# Patient Record
Sex: MALE | Race: WHITE | NOT HISPANIC OR LATINO | Employment: OTHER | URBAN - NONMETROPOLITAN AREA
[De-identification: names, ages, dates, MRNs, and addresses within clinical notes are randomized per-mention and may not be internally consistent; named-entity substitution may affect disease eponyms.]

---

## 2019-03-13 ENCOUNTER — APPOINTMENT (OUTPATIENT)
Dept: GENERAL RADIOLOGY | Facility: HOSPITAL | Age: 84
End: 2019-03-13

## 2019-03-13 ENCOUNTER — HOSPITAL ENCOUNTER (EMERGENCY)
Facility: HOSPITAL | Age: 84
Discharge: HOME | End: 2019-03-13
Attending: EMERGENCY MEDICINE | Admitting: FAMILY MEDICINE

## 2019-03-13 ENCOUNTER — APPOINTMENT (OUTPATIENT)
Dept: CT IMAGING | Facility: HOSPITAL | Age: 84
End: 2019-03-13

## 2019-03-13 VITALS
HEIGHT: 69 IN | OXYGEN SATURATION: 98 % | TEMPERATURE: 97.4 F | WEIGHT: 163 LBS | HEART RATE: 54 BPM | RESPIRATION RATE: 16 BRPM | DIASTOLIC BLOOD PRESSURE: 80 MMHG | SYSTOLIC BLOOD PRESSURE: 189 MMHG | BODY MASS INDEX: 24.14 KG/M2

## 2019-03-13 DIAGNOSIS — R55 SYNCOPE, UNSPECIFIED SYNCOPE TYPE: ICD-10-CM

## 2019-03-13 DIAGNOSIS — N28.9 FUNCTION KIDNEY DECREASED: ICD-10-CM

## 2019-03-13 DIAGNOSIS — I95.1 ORTHOSTATIC HYPOTENSION: Primary | ICD-10-CM

## 2019-03-13 LAB
ALBUMIN SERPL-MCNC: 3.9 G/DL (ref 3.4–4.8)
ALBUMIN/GLOB SERPL: 1.2 G/DL (ref 1.1–1.8)
ALP SERPL-CCNC: 73 U/L (ref 38–126)
ALT SERPL W P-5'-P-CCNC: 10 U/L (ref 21–72)
ANION GAP SERPL CALCULATED.3IONS-SCNC: 9 MMOL/L (ref 5–15)
AST SERPL-CCNC: 25 U/L (ref 17–59)
BASOPHILS # BLD AUTO: 0.05 10*3/MM3 (ref 0–0.2)
BASOPHILS NFR BLD AUTO: 0.6 % (ref 0–1.5)
BILIRUB SERPL-MCNC: 0.3 MG/DL (ref 0.2–1.3)
BUN BLD-MCNC: 24 MG/DL (ref 7–21)
BUN/CREAT SERPL: 15.5 (ref 7–25)
CALCIUM SPEC-SCNC: 8.7 MG/DL (ref 8.4–10.2)
CHLORIDE SERPL-SCNC: 104 MMOL/L (ref 95–110)
CO2 SERPL-SCNC: 25 MMOL/L (ref 22–31)
CREAT BLD-MCNC: 1.55 MG/DL (ref 0.7–1.3)
DEPRECATED RDW RBC AUTO: 48.3 FL (ref 37–54)
EOSINOPHIL # BLD AUTO: 0.43 10*3/MM3 (ref 0–0.4)
EOSINOPHIL NFR BLD AUTO: 5 % (ref 0.3–6.2)
ERYTHROCYTE [DISTWIDTH] IN BLOOD BY AUTOMATED COUNT: 14.2 % (ref 12.3–15.4)
GFR SERPL CREATININE-BSD FRML MDRD: 43 ML/MIN/1.73 (ref 42–98)
GLOBULIN UR ELPH-MCNC: 3.3 GM/DL (ref 2.3–3.5)
GLUCOSE BLD-MCNC: 120 MG/DL (ref 60–100)
HCT VFR BLD AUTO: 31.7 % (ref 37.5–51)
HGB BLD-MCNC: 10.3 G/DL (ref 13–17.7)
HOLD SPECIMEN: NORMAL
HOLD SPECIMEN: NORMAL
IMM GRANULOCYTES # BLD AUTO: 0.03 10*3/MM3 (ref 0–0.05)
IMM GRANULOCYTES NFR BLD AUTO: 0.3 % (ref 0–0.5)
LYMPHOCYTES # BLD AUTO: 2.18 10*3/MM3 (ref 0.7–3.1)
LYMPHOCYTES NFR BLD AUTO: 25.2 % (ref 19.6–45.3)
MAGNESIUM SERPL-MCNC: 1.9 MG/DL (ref 1.6–2.3)
MCH RBC QN AUTO: 30 PG (ref 26.6–33)
MCHC RBC AUTO-ENTMCNC: 32.5 G/DL (ref 31.5–35.7)
MCV RBC AUTO: 92.4 FL (ref 79–97)
MONOCYTES # BLD AUTO: 0.63 10*3/MM3 (ref 0.1–0.9)
MONOCYTES NFR BLD AUTO: 7.3 % (ref 5–12)
NEUTROPHILS # BLD AUTO: 5.34 10*3/MM3 (ref 1.4–7)
NEUTROPHILS NFR BLD AUTO: 61.6 % (ref 42.7–76)
NRBC BLD AUTO-RTO: 0 /100 WBC (ref 0–0)
PLATELET # BLD AUTO: 187 10*3/MM3 (ref 140–450)
PMV BLD AUTO: 11 FL (ref 6–12)
POTASSIUM BLD-SCNC: 3.7 MMOL/L (ref 3.5–5.1)
PROT SERPL-MCNC: 7.2 G/DL (ref 6.3–8.6)
RBC # BLD AUTO: 3.43 10*6/MM3 (ref 4.14–5.8)
SODIUM BLD-SCNC: 138 MMOL/L (ref 137–145)
TROPONIN I SERPL-MCNC: <0.012 NG/ML
WBC NRBC COR # BLD: 8.66 10*3/MM3 (ref 3.4–10.8)
WHOLE BLOOD HOLD SPECIMEN: NORMAL
WHOLE BLOOD HOLD SPECIMEN: NORMAL

## 2019-03-13 PROCEDURE — 71045 X-RAY EXAM CHEST 1 VIEW: CPT

## 2019-03-13 PROCEDURE — 80053 COMPREHEN METABOLIC PANEL: CPT | Performed by: EMERGENCY MEDICINE

## 2019-03-13 PROCEDURE — 85025 COMPLETE CBC W/AUTO DIFF WBC: CPT | Performed by: EMERGENCY MEDICINE

## 2019-03-13 PROCEDURE — 93005 ELECTROCARDIOGRAM TRACING: CPT | Performed by: EMERGENCY MEDICINE

## 2019-03-13 PROCEDURE — 96360 HYDRATION IV INFUSION INIT: CPT

## 2019-03-13 PROCEDURE — 93010 ELECTROCARDIOGRAM REPORT: CPT | Performed by: INTERNAL MEDICINE

## 2019-03-13 PROCEDURE — 99284 EMERGENCY DEPT VISIT MOD MDM: CPT

## 2019-03-13 PROCEDURE — 83735 ASSAY OF MAGNESIUM: CPT | Performed by: EMERGENCY MEDICINE

## 2019-03-13 PROCEDURE — 70450 CT HEAD/BRAIN W/O DYE: CPT

## 2019-03-13 PROCEDURE — 84484 ASSAY OF TROPONIN QUANT: CPT | Performed by: EMERGENCY MEDICINE

## 2019-03-13 RX ORDER — SODIUM CHLORIDE 0.9 % (FLUSH) 0.9 %
10 SYRINGE (ML) INJECTION AS NEEDED
Status: DISCONTINUED | OUTPATIENT
Start: 2019-03-13 | End: 2019-03-13 | Stop reason: HOSPADM

## 2019-03-13 RX ORDER — LANOLIN ALCOHOL/MO/W.PET/CERES
1000 CREAM (GRAM) TOPICAL DAILY
COMMUNITY
End: 2021-06-29 | Stop reason: HOSPADM

## 2019-03-13 RX ORDER — CLONIDINE HYDROCHLORIDE 0.2 MG/1
0.2 TABLET ORAL ONCE
Status: COMPLETED | OUTPATIENT
Start: 2019-03-13 | End: 2019-03-13

## 2019-03-13 RX ORDER — DONEPEZIL HYDROCHLORIDE 10 MG/1
10 TABLET, FILM COATED ORAL
Status: ON HOLD | COMMUNITY
End: 2022-01-12

## 2019-03-13 RX ORDER — HYDRALAZINE HYDROCHLORIDE 20 MG/ML
20 INJECTION INTRAMUSCULAR; INTRAVENOUS ONCE
Status: DISCONTINUED | OUTPATIENT
Start: 2019-03-13 | End: 2019-03-13

## 2019-03-13 RX ORDER — VERAPAMIL HYDROCHLORIDE 180 MG/1
120 CAPSULE, EXTENDED RELEASE ORAL
COMMUNITY

## 2019-03-13 RX ORDER — MEMANTINE HYDROCHLORIDE 10 MG/1
10 TABLET ORAL
Status: ON HOLD | COMMUNITY
End: 2022-01-12

## 2019-03-13 RX ORDER — ASPIRIN 81 MG/1
81 TABLET, CHEWABLE ORAL DAILY
COMMUNITY
End: 2022-01-14 | Stop reason: HOSPADM

## 2019-03-13 RX ORDER — ROSUVASTATIN CALCIUM 20 MG/1
20 TABLET, COATED ORAL
COMMUNITY

## 2019-03-13 RX ADMIN — SODIUM CHLORIDE 1000 ML: 900 INJECTION, SOLUTION INTRAVENOUS at 19:18

## 2019-03-13 RX ADMIN — CLONIDINE HYDROCHLORIDE 0.2 MG: 0.2 TABLET ORAL at 21:16

## 2019-03-13 NOTE — ED PROVIDER NOTES
Subjective   Patient presents to emergency department for syncope.  [place] [address] and had an episode of syncope, nausea, diaphoresis.  Wife states he returned to baseline after just a few minutes.  He does have moderate to severe dementia.  Patient currently asymptomatic.          History provided by:  Patient   used: No    Syncope   Episode history:  Single  Most recent episode:  Today  Duration:  2 minutes  Progression:  Resolved  Chronicity:  New  Context: normal activity and standing up    Witnessed: yes    Relieved by: rest.  Associated symptoms: confusion    Associated symptoms: no anxiety, no chest pain, no diaphoresis, no difficulty breathing, no dizziness, no fever, no focal sensory loss, no focal weakness, no headaches, no malaise/fatigue, no nausea, no palpitations, no recent injury, no rectal bleeding, no shortness of breath, no visual change, no vomiting and no weakness        Review of Systems   Constitutional: Negative for diaphoresis, fever and malaise/fatigue.   HENT: Negative for sore throat and trouble swallowing.    Eyes: Negative for visual disturbance.   Respiratory: Negative for shortness of breath.    Cardiovascular: Positive for syncope. Negative for chest pain and palpitations.   Gastrointestinal: Negative for nausea and vomiting.   Genitourinary: Negative for dysuria and flank pain.   Musculoskeletal: Negative for back pain.   Skin: Negative for color change.   Allergic/Immunologic: Negative for immunocompromised state.   Neurological: Negative for dizziness, focal weakness, weakness and headaches.   Psychiatric/Behavioral: Positive for confusion.        Wife says he has dementia and is at baseline       Past Medical History:   Diagnosis Date   • Alzheimer's dementia    • Hyperlipidemia    • Hypertension        Allergies   Allergen Reactions   • Codeine Other (See Comments)     Unknown reaction; patient's wife stated that patient is allergic to  "medication.        Past Surgical History:   Procedure Laterality Date   • KNEE SURGERY Left        History reviewed. No pertinent family history.    Social History     Socioeconomic History   • Marital status:      Spouse name: Not on file   • Number of children: Not on file   • Years of education: Not on file   • Highest education level: Not on file   Tobacco Use   • Smoking status: Never Smoker   • Smokeless tobacco: Never Used   Substance and Sexual Activity   • Alcohol use: No     Frequency: Never   • Drug use: No   • Sexual activity: Defer           Objective   Physical Exam   Constitutional: He is oriented to person, place, and time. He appears well-developed and well-nourished. No distress.   HENT:   Head: Atraumatic.   Indentation in posterior right scalp from \"horse kicking him when he was a child\".   Eyes: Conjunctivae and EOM are normal. Pupils are equal, round, and reactive to light. No scleral icterus.   Neck: Normal range of motion. Neck supple.   Cardiovascular: Normal rate, regular rhythm, normal heart sounds and intact distal pulses.   Pulmonary/Chest: Effort normal and breath sounds normal. No respiratory distress. He has no wheezes.   Abdominal: Soft. He exhibits no distension and no mass. There is no tenderness. There is no guarding.   Musculoskeletal: He exhibits no edema.   Neurological: He is alert and oriented to person, place, and time.   Skin: Skin is warm. Capillary refill takes less than 2 seconds.   Psychiatric: He has a normal mood and affect. His behavior is normal. Thought content normal.   Nursing note and vitals reviewed.      ECG 12 Lead    Date/Time: 3/13/2019 8:32 PM  Performed by: Lavelle Islas PA-C  Authorized by: Deon Delgado MD   Interpreted by physician  Comparison: compared with previous ECG from 5/13/2013  Rhythm: sinus bradycardia  Rate: bradycardic  BPM: 56  ST Segments: ST segments normal  Clinical impression: non-specific ECG                 ED " Course  ED Course as of Mar 13 2045   Wed Mar 13, 2019   2028 Orthostatic vitals show likely mild volume depletion.    [WINSOME]      ED Course User Index  [WINSOME] Lavelle Islas PA-C      Results for orders placed or performed during the hospital encounter of 03/13/19   Comprehensive Metabolic Panel   Result Value Ref Range    Glucose 120 (H) 60 - 100 mg/dL    BUN 24 (H) 7 - 21 mg/dL    Creatinine 1.55 (H) 0.70 - 1.30 mg/dL    Sodium 138 137 - 145 mmol/L    Potassium 3.7 3.5 - 5.1 mmol/L    Chloride 104 95 - 110 mmol/L    CO2 25.0 22.0 - 31.0 mmol/L    Calcium 8.7 8.4 - 10.2 mg/dL    Total Protein 7.2 6.3 - 8.6 g/dL    Albumin 3.90 3.40 - 4.80 g/dL    ALT (SGPT) 10 (L) 21 - 72 U/L    AST (SGOT) 25 17 - 59 U/L    Alkaline Phosphatase 73 38 - 126 U/L    Total Bilirubin 0.3 0.2 - 1.3 mg/dL    eGFR Non African Amer 43 42 - 98 mL/min/1.73    Globulin 3.3 2.3 - 3.5 gm/dL    A/G Ratio 1.2 1.1 - 1.8 g/dL    BUN/Creatinine Ratio 15.5 7.0 - 25.0    Anion Gap 9.0 5.0 - 15.0 mmol/L   Magnesium   Result Value Ref Range    Magnesium 1.9 1.6 - 2.3 mg/dL   Troponin   Result Value Ref Range    Troponin I <0.012 <=0.034 ng/mL   CBC Auto Differential   Result Value Ref Range    WBC 8.66 3.40 - 10.80 10*3/mm3    RBC 3.43 (L) 4.14 - 5.80 10*6/mm3    Hemoglobin 10.3 (L) 13.0 - 17.7 g/dL    Hematocrit 31.7 (L) 37.5 - 51.0 %    MCV 92.4 79.0 - 97.0 fL    MCH 30.0 26.6 - 33.0 pg    MCHC 32.5 31.5 - 35.7 g/dL    RDW 14.2 12.3 - 15.4 %    RDW-SD 48.3 37.0 - 54.0 fl    MPV 11.0 6.0 - 12.0 fL    Platelets 187 140 - 450 10*3/mm3    Neutrophil % 61.6 42.7 - 76.0 %    Lymphocyte % 25.2 19.6 - 45.3 %    Monocyte % 7.3 5.0 - 12.0 %    Eosinophil % 5.0 0.3 - 6.2 %    Basophil % 0.6 0.0 - 1.5 %    Immature Grans % 0.3 0.0 - 0.5 %    Neutrophils, Absolute 5.34 1.40 - 7.00 10*3/mm3    Lymphocytes, Absolute 2.18 0.70 - 3.10 10*3/mm3    Monocytes, Absolute 0.63 0.10 - 0.90 10*3/mm3    Eosinophils, Absolute 0.43 (H) 0.00 - 0.40 10*3/mm3    Basophils,  Absolute 0.05 0.00 - 0.20 10*3/mm3    Immature Grans, Absolute 0.03 0.00 - 0.05 10*3/mm3    nRBC 0.0 0.0 - 0.0 /100 WBC   Light Blue Top   Result Value Ref Range    Extra Tube hold for add-on    Green Top (Gel)   Result Value Ref Range    Extra Tube Hold for add-ons.    Lavender Top   Result Value Ref Range    Extra Tube hold for add-on    Gold Top - SST   Result Value Ref Range    Extra Tube Hold for add-ons.      Ct Head Without Contrast    Result Date: 3/13/2019  Narrative: .    EXAMINATION:  Computed Tomography    REGION:  Head         INDICATION:  syncope  HISTORY: CORRELATIVE IMAGING:    none  TECHNIQUE:  iv contrast:  no  This exam was performed according to the departmental dose-optimization program which includes automated exposure control, adjustment of the mA and/or kV according to patient size and/or use of iterative reconstruction technique.          COMMENTS:            - atrophy:              Moderate   - cortex: age related degenerated changes    - deep white mat: age related degenerated changes     - hemorrhage:       none     - fluid collection: no intra/extra axial fluid collection   - mass / lesion:     no focal parenchymal lesion(s)     - gray/white jxn:   borders preserved       - brain stem:         wnl     - cerebellum:        wnl     - globes / retro:     wnl     - ventricles: Enlarged, somewhat greater than that expected for the degree of moderate atrophy present.   - midline shift:      no     - sinuses:              wnl     - mastoids:           wnl      - osseous:             wnl     - misc.: .       Impression: CONCLUSION:  1.  Negative examination for acute intracranial pathology.    2. Moderate degree of atrophy and enlarged ventricular system somewhat greater than that expected for the degree of atrophy present. Suggest clinical correlation for normal pressure hydrocephalus.   If signs or symptoms persist beyond reasonable expectations, a MRI examination is suggested as is deemed  clinically appropriate. Electronically signed by:  ENOC Morgan MD  3/13/2019 8:37 PM CDT Workstation: [id number]    Xr Chest 1 View    Result Date: 3/13/2019  Narrative: EXAM:         Radiograph(s), Chest VIEWS:   Frontal  ; 1     DATE/TIME:  3/13/2019 8:16 PM CDT            INDICATION:   syncope  COMPARISON:  CXR: none         FINDINGS:         - lines/tubes:    none   - cardiac:         size within normal limits       - mediastinum: contour within normal limits       - lungs:         no focal air space process, pulmonary interstitial edema, nodule(s)/mass           - pleura:         no evidence of  fluid                - osseous:         unremarkable for age                - misc.:        Impression: CONCLUSION:    1. No evidence of an active cardiopulmonary process.                                                  Electronically signed by:  ENOC Morgan MD  3/13/2019 8:17 PM CDT Workstation: [id number]    Discussed results with patient/wife.  Gave educational materials.  Advised close follow up with PCP.  Return to emergency department for new or worsening symptoms.                  MDM      Final diagnoses:   Orthostatic hypotension   Syncope, unspecified syncope type   Function kidney decreased            Lavelle Islas PA-C  03/13/19 2045

## 2021-03-03 ENCOUNTER — APPOINTMENT (OUTPATIENT)
Dept: VACCINE CLINIC | Facility: HOSPITAL | Age: 86
End: 2021-03-03

## 2021-03-12 ENCOUNTER — IMMUNIZATION (OUTPATIENT)
Dept: VACCINE CLINIC | Facility: HOSPITAL | Age: 86
End: 2021-03-12

## 2021-03-12 PROCEDURE — 91300 HC SARSCOV02 VAC 30MCG/0.3ML IM: CPT | Performed by: NURSE PRACTITIONER

## 2021-03-12 PROCEDURE — 0001A: CPT | Performed by: NURSE PRACTITIONER

## 2021-04-02 ENCOUNTER — IMMUNIZATION (OUTPATIENT)
Dept: VACCINE CLINIC | Facility: HOSPITAL | Age: 86
End: 2021-04-02

## 2021-04-02 PROCEDURE — 0002A: CPT | Performed by: NURSE PRACTITIONER

## 2021-04-02 PROCEDURE — 91300 HC SARSCOV02 VAC 30MCG/0.3ML IM: CPT | Performed by: NURSE PRACTITIONER

## 2021-06-25 ENCOUNTER — APPOINTMENT (OUTPATIENT)
Dept: GENERAL RADIOLOGY | Facility: HOSPITAL | Age: 86
End: 2021-06-25

## 2021-06-25 ENCOUNTER — HOSPITAL ENCOUNTER (OUTPATIENT)
Facility: HOSPITAL | Age: 86
Setting detail: OBSERVATION
Discharge: HOME HEALTH | End: 2021-06-29
Attending: STUDENT IN AN ORGANIZED HEALTH CARE EDUCATION/TRAINING PROGRAM | Admitting: HOSPITALIST

## 2021-06-25 DIAGNOSIS — G30.9 ALZHEIMER'S DEMENTIA WITHOUT BEHAVIORAL DISTURBANCE, UNSPECIFIED TIMING OF DEMENTIA ONSET: ICD-10-CM

## 2021-06-25 DIAGNOSIS — Z74.09 IMPAIRED MOBILITY AND ADLS: ICD-10-CM

## 2021-06-25 DIAGNOSIS — Z74.09 IMPAIRED FUNCTIONAL MOBILITY, BALANCE, GAIT, AND ENDURANCE: ICD-10-CM

## 2021-06-25 DIAGNOSIS — R79.89 ELEVATED LACTIC ACID LEVEL: Primary | ICD-10-CM

## 2021-06-25 DIAGNOSIS — Z78.9 IMPAIRED MOBILITY AND ADLS: ICD-10-CM

## 2021-06-25 DIAGNOSIS — R53.1 WEAKNESS: ICD-10-CM

## 2021-06-25 DIAGNOSIS — F02.80 ALZHEIMER'S DEMENTIA WITHOUT BEHAVIORAL DISTURBANCE, UNSPECIFIED TIMING OF DEMENTIA ONSET: ICD-10-CM

## 2021-06-25 PROBLEM — N17.9 AKI (ACUTE KIDNEY INJURY) (HCC): Status: ACTIVE | Noted: 2021-06-25

## 2021-06-25 LAB
ANION GAP SERPL CALCULATED.3IONS-SCNC: 9 MMOL/L (ref 5–15)
BACTERIA UR QL AUTO: ABNORMAL /HPF
BASOPHILS # BLD AUTO: 0.04 10*3/MM3 (ref 0–0.2)
BASOPHILS NFR BLD AUTO: 0.4 % (ref 0–1.5)
BILIRUB UR QL STRIP: NEGATIVE
BUN SERPL-MCNC: 28 MG/DL (ref 8–23)
BUN/CREAT SERPL: 12.8 (ref 7–25)
CALCIUM SPEC-SCNC: 9.3 MG/DL (ref 8.6–10.5)
CHLORIDE SERPL-SCNC: 109 MMOL/L (ref 98–107)
CLARITY UR: CLEAR
CO2 SERPL-SCNC: 27 MMOL/L (ref 22–29)
COLOR UR: YELLOW
CREAT SERPL-MCNC: 2.19 MG/DL (ref 0.76–1.27)
D-LACTATE SERPL-SCNC: 1.1 MMOL/L (ref 0.5–2)
D-LACTATE SERPL-SCNC: 3.9 MMOL/L (ref 0.5–2)
DEPRECATED RDW RBC AUTO: 51.7 FL (ref 37–54)
EOSINOPHIL # BLD AUTO: 0.26 10*3/MM3 (ref 0–0.4)
EOSINOPHIL NFR BLD AUTO: 2.4 % (ref 0.3–6.2)
ERYTHROCYTE [DISTWIDTH] IN BLOOD BY AUTOMATED COUNT: 14.4 % (ref 12.3–15.4)
FLUAV SUBTYP SPEC NAA+PROBE: NOT DETECTED
FLUBV RNA ISLT QL NAA+PROBE: NOT DETECTED
GFR SERPL CREATININE-BSD FRML MDRD: 29 ML/MIN/1.73
GLUCOSE SERPL-MCNC: 123 MG/DL (ref 65–99)
GLUCOSE UR STRIP-MCNC: NEGATIVE MG/DL
HCT VFR BLD AUTO: 37.7 % (ref 37.5–51)
HGB BLD-MCNC: 12 G/DL (ref 13–17.7)
HGB UR QL STRIP.AUTO: ABNORMAL
HOLD SPECIMEN: NORMAL
HOLD SPECIMEN: NORMAL
HYALINE CASTS UR QL AUTO: ABNORMAL /LPF
IMM GRANULOCYTES # BLD AUTO: 0.05 10*3/MM3 (ref 0–0.05)
IMM GRANULOCYTES NFR BLD AUTO: 0.5 % (ref 0–0.5)
KETONES UR QL STRIP: NEGATIVE
LEUKOCYTE ESTERASE UR QL STRIP.AUTO: NEGATIVE
LYMPHOCYTES # BLD AUTO: 1.08 10*3/MM3 (ref 0.7–3.1)
LYMPHOCYTES NFR BLD AUTO: 9.8 % (ref 19.6–45.3)
MAGNESIUM SERPL-MCNC: 1.9 MG/DL (ref 1.6–2.4)
MCH RBC QN AUTO: 31.1 PG (ref 26.6–33)
MCHC RBC AUTO-ENTMCNC: 31.8 G/DL (ref 31.5–35.7)
MCV RBC AUTO: 97.7 FL (ref 79–97)
MONOCYTES # BLD AUTO: 1.09 10*3/MM3 (ref 0.1–0.9)
MONOCYTES NFR BLD AUTO: 9.9 % (ref 5–12)
NEUTROPHILS NFR BLD AUTO: 77 % (ref 42.7–76)
NEUTROPHILS NFR BLD AUTO: 8.51 10*3/MM3 (ref 1.7–7)
NITRITE UR QL STRIP: NEGATIVE
NRBC BLD AUTO-RTO: 0 /100 WBC (ref 0–0.2)
NT-PROBNP SERPL-MCNC: 807.3 PG/ML (ref 0–1800)
PH UR STRIP.AUTO: 6.5 [PH] (ref 5–9)
PLATELET # BLD AUTO: 199 10*3/MM3 (ref 140–450)
PMV BLD AUTO: 11.1 FL (ref 6–12)
POTASSIUM SERPL-SCNC: 5 MMOL/L (ref 3.5–5.2)
PROT UR QL STRIP: ABNORMAL
RBC # BLD AUTO: 3.86 10*6/MM3 (ref 4.14–5.8)
RBC # UR: ABNORMAL /HPF
REF LAB TEST METHOD: ABNORMAL
SARS-COV-2 RNA PNL SPEC NAA+PROBE: NOT DETECTED
SODIUM SERPL-SCNC: 145 MMOL/L (ref 136–145)
SP GR UR STRIP: 1.02 (ref 1–1.03)
SQUAMOUS #/AREA URNS HPF: ABNORMAL /HPF
TROPONIN T SERPL-MCNC: 0.02 NG/ML (ref 0–0.03)
UROBILINOGEN UR QL STRIP: ABNORMAL
WBC # BLD AUTO: 11.03 10*3/MM3 (ref 3.4–10.8)
WBC UR QL AUTO: ABNORMAL /HPF
WHOLE BLOOD HOLD SPECIMEN: NORMAL

## 2021-06-25 PROCEDURE — C9803 HOPD COVID-19 SPEC COLLECT: HCPCS

## 2021-06-25 PROCEDURE — 83735 ASSAY OF MAGNESIUM: CPT | Performed by: STUDENT IN AN ORGANIZED HEALTH CARE EDUCATION/TRAINING PROGRAM

## 2021-06-25 PROCEDURE — 96367 TX/PROPH/DG ADDL SEQ IV INF: CPT

## 2021-06-25 PROCEDURE — 71045 X-RAY EXAM CHEST 1 VIEW: CPT

## 2021-06-25 PROCEDURE — 81001 URINALYSIS AUTO W/SCOPE: CPT | Performed by: STUDENT IN AN ORGANIZED HEALTH CARE EDUCATION/TRAINING PROGRAM

## 2021-06-25 PROCEDURE — 93010 ELECTROCARDIOGRAM REPORT: CPT | Performed by: INTERNAL MEDICINE

## 2021-06-25 PROCEDURE — 84484 ASSAY OF TROPONIN QUANT: CPT | Performed by: STUDENT IN AN ORGANIZED HEALTH CARE EDUCATION/TRAINING PROGRAM

## 2021-06-25 PROCEDURE — 96365 THER/PROPH/DIAG IV INF INIT: CPT

## 2021-06-25 PROCEDURE — 85025 COMPLETE CBC W/AUTO DIFF WBC: CPT | Performed by: STUDENT IN AN ORGANIZED HEALTH CARE EDUCATION/TRAINING PROGRAM

## 2021-06-25 PROCEDURE — 84145 PROCALCITONIN (PCT): CPT | Performed by: INTERNAL MEDICINE

## 2021-06-25 PROCEDURE — 87040 BLOOD CULTURE FOR BACTERIA: CPT | Performed by: STUDENT IN AN ORGANIZED HEALTH CARE EDUCATION/TRAINING PROGRAM

## 2021-06-25 PROCEDURE — 80048 BASIC METABOLIC PNL TOTAL CA: CPT | Performed by: STUDENT IN AN ORGANIZED HEALTH CARE EDUCATION/TRAINING PROGRAM

## 2021-06-25 PROCEDURE — 25010000002 PIPERACILLIN SOD-TAZOBACTAM PER 1 G: Performed by: STUDENT IN AN ORGANIZED HEALTH CARE EDUCATION/TRAINING PROGRAM

## 2021-06-25 PROCEDURE — 83605 ASSAY OF LACTIC ACID: CPT | Performed by: STUDENT IN AN ORGANIZED HEALTH CARE EDUCATION/TRAINING PROGRAM

## 2021-06-25 PROCEDURE — 93005 ELECTROCARDIOGRAM TRACING: CPT | Performed by: STUDENT IN AN ORGANIZED HEALTH CARE EDUCATION/TRAINING PROGRAM

## 2021-06-25 PROCEDURE — 87636 SARSCOV2 & INF A&B AMP PRB: CPT | Performed by: STUDENT IN AN ORGANIZED HEALTH CARE EDUCATION/TRAINING PROGRAM

## 2021-06-25 PROCEDURE — 99285 EMERGENCY DEPT VISIT HI MDM: CPT

## 2021-06-25 PROCEDURE — 25010000002 VANCOMYCIN 5 G RECONSTITUTED SOLUTION: Performed by: STUDENT IN AN ORGANIZED HEALTH CARE EDUCATION/TRAINING PROGRAM

## 2021-06-25 PROCEDURE — G0378 HOSPITAL OBSERVATION PER HR: HCPCS

## 2021-06-25 PROCEDURE — 83880 ASSAY OF NATRIURETIC PEPTIDE: CPT | Performed by: STUDENT IN AN ORGANIZED HEALTH CARE EDUCATION/TRAINING PROGRAM

## 2021-06-25 RX ORDER — ACETAMINOPHEN 325 MG/1
650 TABLET ORAL EVERY 4 HOURS PRN
Status: DISCONTINUED | OUTPATIENT
Start: 2021-06-25 | End: 2021-06-29 | Stop reason: HOSPADM

## 2021-06-25 RX ORDER — SODIUM CHLORIDE 0.9 % (FLUSH) 0.9 %
10 SYRINGE (ML) INJECTION EVERY 12 HOURS SCHEDULED
Status: DISCONTINUED | OUTPATIENT
Start: 2021-06-25 | End: 2021-06-29 | Stop reason: HOSPADM

## 2021-06-25 RX ORDER — SODIUM CHLORIDE, SODIUM LACTATE, POTASSIUM CHLORIDE, CALCIUM CHLORIDE 600; 310; 30; 20 MG/100ML; MG/100ML; MG/100ML; MG/100ML
100 INJECTION, SOLUTION INTRAVENOUS CONTINUOUS
Status: DISCONTINUED | OUTPATIENT
Start: 2021-06-25 | End: 2021-06-26

## 2021-06-25 RX ORDER — ASPIRIN 81 MG/1
81 TABLET, CHEWABLE ORAL DAILY
Status: DISCONTINUED | OUTPATIENT
Start: 2021-06-26 | End: 2021-06-29 | Stop reason: HOSPADM

## 2021-06-25 RX ORDER — ROSUVASTATIN CALCIUM 20 MG/1
20 TABLET, COATED ORAL DAILY
Status: DISCONTINUED | OUTPATIENT
Start: 2021-06-26 | End: 2021-06-29 | Stop reason: HOSPADM

## 2021-06-25 RX ORDER — DONEPEZIL HYDROCHLORIDE 10 MG/1
10 TABLET, FILM COATED ORAL NIGHTLY
Status: DISCONTINUED | OUTPATIENT
Start: 2021-06-25 | End: 2021-06-29 | Stop reason: HOSPADM

## 2021-06-25 RX ORDER — HEPARIN SODIUM 5000 [USP'U]/ML
5000 INJECTION, SOLUTION INTRAVENOUS; SUBCUTANEOUS EVERY 8 HOURS SCHEDULED
Status: DISCONTINUED | OUTPATIENT
Start: 2021-06-25 | End: 2021-06-29 | Stop reason: HOSPADM

## 2021-06-25 RX ORDER — SODIUM CHLORIDE 0.9 % (FLUSH) 0.9 %
10 SYRINGE (ML) INJECTION AS NEEDED
Status: DISCONTINUED | OUTPATIENT
Start: 2021-06-25 | End: 2021-06-29 | Stop reason: HOSPADM

## 2021-06-25 RX ORDER — MEMANTINE HYDROCHLORIDE 5 MG/1
10 TABLET ORAL DAILY
Status: DISCONTINUED | OUTPATIENT
Start: 2021-06-26 | End: 2021-06-29 | Stop reason: HOSPADM

## 2021-06-25 RX ADMIN — PIPERACILLIN SODIUM AND TAZOBACTAM SODIUM 3.38 G: 3; .375 INJECTION, POWDER, LYOPHILIZED, FOR SOLUTION INTRAVENOUS at 20:32

## 2021-06-25 RX ADMIN — VANCOMYCIN HYDROCHLORIDE 1500 MG: 5 INJECTION, POWDER, LYOPHILIZED, FOR SOLUTION INTRAVENOUS at 21:31

## 2021-06-25 RX ADMIN — SODIUM CHLORIDE, POTASSIUM CHLORIDE, SODIUM LACTATE AND CALCIUM CHLORIDE 1000 ML: 600; 310; 30; 20 INJECTION, SOLUTION INTRAVENOUS at 20:32

## 2021-06-26 LAB
ANION GAP SERPL CALCULATED.3IONS-SCNC: 8 MMOL/L (ref 5–15)
BASOPHILS # BLD AUTO: 0.04 10*3/MM3 (ref 0–0.2)
BASOPHILS NFR BLD AUTO: 0.5 % (ref 0–1.5)
BUN SERPL-MCNC: 25 MG/DL (ref 8–23)
BUN/CREAT SERPL: 13.4 (ref 7–25)
CALCIUM SPEC-SCNC: 8.8 MG/DL (ref 8.6–10.5)
CHLORIDE SERPL-SCNC: 109 MMOL/L (ref 98–107)
CO2 SERPL-SCNC: 25 MMOL/L (ref 22–29)
CREAT SERPL-MCNC: 1.87 MG/DL (ref 0.76–1.27)
DEPRECATED RDW RBC AUTO: 50.1 FL (ref 37–54)
EOSINOPHIL # BLD AUTO: 0.21 10*3/MM3 (ref 0–0.4)
EOSINOPHIL NFR BLD AUTO: 2.4 % (ref 0.3–6.2)
ERYTHROCYTE [DISTWIDTH] IN BLOOD BY AUTOMATED COUNT: 14.4 % (ref 12.3–15.4)
GFR SERPL CREATININE-BSD FRML MDRD: 34 ML/MIN/1.73
GLUCOSE SERPL-MCNC: 89 MG/DL (ref 65–99)
HCT VFR BLD AUTO: 34 % (ref 37.5–51)
HGB BLD-MCNC: 11.4 G/DL (ref 13–17.7)
IMM GRANULOCYTES # BLD AUTO: 0.03 10*3/MM3 (ref 0–0.05)
IMM GRANULOCYTES NFR BLD AUTO: 0.3 % (ref 0–0.5)
LYMPHOCYTES # BLD AUTO: 1.91 10*3/MM3 (ref 0.7–3.1)
LYMPHOCYTES NFR BLD AUTO: 21.7 % (ref 19.6–45.3)
MCH RBC QN AUTO: 32.2 PG (ref 26.6–33)
MCHC RBC AUTO-ENTMCNC: 33.5 G/DL (ref 31.5–35.7)
MCV RBC AUTO: 96 FL (ref 79–97)
MONOCYTES # BLD AUTO: 0.89 10*3/MM3 (ref 0.1–0.9)
MONOCYTES NFR BLD AUTO: 10.1 % (ref 5–12)
NEUTROPHILS NFR BLD AUTO: 5.73 10*3/MM3 (ref 1.7–7)
NEUTROPHILS NFR BLD AUTO: 65 % (ref 42.7–76)
NRBC BLD AUTO-RTO: 0 /100 WBC (ref 0–0.2)
PLATELET # BLD AUTO: 165 10*3/MM3 (ref 140–450)
PMV BLD AUTO: 11.1 FL (ref 6–12)
POTASSIUM SERPL-SCNC: 4.4 MMOL/L (ref 3.5–5.2)
PROCALCITONIN SERPL-MCNC: 0.14 NG/ML (ref 0–0.25)
QT INTERVAL: 416 MS
QTC INTERVAL: 452 MS
RBC # BLD AUTO: 3.54 10*6/MM3 (ref 4.14–5.8)
SODIUM SERPL-SCNC: 142 MMOL/L (ref 136–145)
TSH SERPL DL<=0.05 MIU/L-ACNC: 1.64 UIU/ML (ref 0.27–4.2)
WBC # BLD AUTO: 8.81 10*3/MM3 (ref 3.4–10.8)

## 2021-06-26 PROCEDURE — 85025 COMPLETE CBC W/AUTO DIFF WBC: CPT | Performed by: INTERNAL MEDICINE

## 2021-06-26 PROCEDURE — 84443 ASSAY THYROID STIM HORMONE: CPT | Performed by: INTERNAL MEDICINE

## 2021-06-26 PROCEDURE — 25010000002 HEPARIN (PORCINE) PER 1000 UNITS: Performed by: INTERNAL MEDICINE

## 2021-06-26 PROCEDURE — G0378 HOSPITAL OBSERVATION PER HR: HCPCS

## 2021-06-26 PROCEDURE — 96372 THER/PROPH/DIAG INJ SC/IM: CPT

## 2021-06-26 PROCEDURE — 97166 OT EVAL MOD COMPLEX 45 MIN: CPT

## 2021-06-26 PROCEDURE — 25010000002 HYDRALAZINE PER 20 MG: Performed by: INTERNAL MEDICINE

## 2021-06-26 PROCEDURE — 97162 PT EVAL MOD COMPLEX 30 MIN: CPT

## 2021-06-26 PROCEDURE — 96361 HYDRATE IV INFUSION ADD-ON: CPT

## 2021-06-26 PROCEDURE — 80048 BASIC METABOLIC PNL TOTAL CA: CPT | Performed by: INTERNAL MEDICINE

## 2021-06-26 PROCEDURE — 96375 TX/PRO/DX INJ NEW DRUG ADDON: CPT

## 2021-06-26 PROCEDURE — 36415 COLL VENOUS BLD VENIPUNCTURE: CPT | Performed by: INTERNAL MEDICINE

## 2021-06-26 RX ORDER — HYDRALAZINE HYDROCHLORIDE 20 MG/ML
10 INJECTION INTRAMUSCULAR; INTRAVENOUS EVERY 6 HOURS PRN
Status: DISCONTINUED | OUTPATIENT
Start: 2021-06-26 | End: 2021-06-29 | Stop reason: HOSPADM

## 2021-06-26 RX ORDER — RISPERIDONE 0.5 MG/1
0.5 TABLET ORAL 2 TIMES DAILY
Status: ON HOLD | COMMUNITY
End: 2022-01-12

## 2021-06-26 RX ORDER — SERTRALINE HYDROCHLORIDE 100 MG/1
100 TABLET, FILM COATED ORAL DAILY
COMMUNITY

## 2021-06-26 RX ORDER — ASPIRIN 325 MG
325 TABLET ORAL DAILY
COMMUNITY
End: 2021-06-29 | Stop reason: HOSPADM

## 2021-06-26 RX ORDER — LEVOTHYROXINE SODIUM 0.15 MG/1
150 TABLET ORAL DAILY
COMMUNITY

## 2021-06-26 RX ORDER — QUETIAPINE FUMARATE 25 MG/1
50 TABLET, FILM COATED ORAL 2 TIMES DAILY
COMMUNITY

## 2021-06-26 RX ADMIN — HEPARIN SODIUM 5000 UNITS: 5000 INJECTION INTRAVENOUS; SUBCUTANEOUS at 20:12

## 2021-06-26 RX ADMIN — SODIUM CHLORIDE, POTASSIUM CHLORIDE, SODIUM LACTATE AND CALCIUM CHLORIDE 100 ML/HR: 600; 310; 30; 20 INJECTION, SOLUTION INTRAVENOUS at 01:00

## 2021-06-26 RX ADMIN — HEPARIN SODIUM 5000 UNITS: 5000 INJECTION INTRAVENOUS; SUBCUTANEOUS at 14:31

## 2021-06-26 RX ADMIN — SODIUM CHLORIDE, PRESERVATIVE FREE 10 ML: 5 INJECTION INTRAVENOUS at 20:12

## 2021-06-26 RX ADMIN — VERAPAMIL HYDROCHLORIDE 180 MG: 180 TABLET, FILM COATED, EXTENDED RELEASE ORAL at 01:00

## 2021-06-26 RX ADMIN — HYDRALAZINE HYDROCHLORIDE 10 MG: 20 INJECTION INTRAMUSCULAR; INTRAVENOUS at 03:21

## 2021-06-26 RX ADMIN — DONEPEZIL HYDROCHLORIDE 10 MG: 10 TABLET ORAL at 00:49

## 2021-06-26 RX ADMIN — ASPIRIN 81 MG: 81 TABLET, CHEWABLE ORAL at 09:38

## 2021-06-26 RX ADMIN — SODIUM CHLORIDE, PRESERVATIVE FREE 10 ML: 5 INJECTION INTRAVENOUS at 05:00

## 2021-06-26 RX ADMIN — DONEPEZIL HYDROCHLORIDE 10 MG: 10 TABLET ORAL at 20:11

## 2021-06-26 RX ADMIN — HEPARIN SODIUM 5000 UNITS: 5000 INJECTION INTRAVENOUS; SUBCUTANEOUS at 00:48

## 2021-06-26 RX ADMIN — SODIUM CHLORIDE, POTASSIUM CHLORIDE, SODIUM LACTATE AND CALCIUM CHLORIDE 100 ML/HR: 600; 310; 30; 20 INJECTION, SOLUTION INTRAVENOUS at 00:08

## 2021-06-26 RX ADMIN — ROSUVASTATIN CALCIUM 20 MG: 20 TABLET, FILM COATED ORAL at 10:29

## 2021-06-26 RX ADMIN — MEMANTINE 10 MG: 5 TABLET ORAL at 10:30

## 2021-06-27 PROCEDURE — 96376 TX/PRO/DX INJ SAME DRUG ADON: CPT

## 2021-06-27 PROCEDURE — 25010000002 HYDRALAZINE PER 20 MG: Performed by: INTERNAL MEDICINE

## 2021-06-27 PROCEDURE — 96372 THER/PROPH/DIAG INJ SC/IM: CPT

## 2021-06-27 PROCEDURE — G0378 HOSPITAL OBSERVATION PER HR: HCPCS

## 2021-06-27 PROCEDURE — 25010000002 HEPARIN (PORCINE) PER 1000 UNITS: Performed by: INTERNAL MEDICINE

## 2021-06-27 PROCEDURE — 97116 GAIT TRAINING THERAPY: CPT

## 2021-06-27 PROCEDURE — 97535 SELF CARE MNGMENT TRAINING: CPT

## 2021-06-27 RX ADMIN — HEPARIN SODIUM 5000 UNITS: 5000 INJECTION INTRAVENOUS; SUBCUTANEOUS at 05:04

## 2021-06-27 RX ADMIN — VERAPAMIL HYDROCHLORIDE 180 MG: 180 TABLET, FILM COATED, EXTENDED RELEASE ORAL at 09:00

## 2021-06-27 RX ADMIN — HEPARIN SODIUM 5000 UNITS: 5000 INJECTION INTRAVENOUS; SUBCUTANEOUS at 21:51

## 2021-06-27 RX ADMIN — SODIUM CHLORIDE, PRESERVATIVE FREE 10 ML: 5 INJECTION INTRAVENOUS at 21:53

## 2021-06-27 RX ADMIN — MEMANTINE 10 MG: 5 TABLET ORAL at 09:00

## 2021-06-27 RX ADMIN — ROSUVASTATIN CALCIUM 20 MG: 20 TABLET, FILM COATED ORAL at 09:00

## 2021-06-27 RX ADMIN — ASPIRIN 81 MG: 81 TABLET, CHEWABLE ORAL at 09:01

## 2021-06-27 RX ADMIN — DONEPEZIL HYDROCHLORIDE 10 MG: 10 TABLET ORAL at 21:53

## 2021-06-27 RX ADMIN — HYDRALAZINE HYDROCHLORIDE 10 MG: 20 INJECTION INTRAMUSCULAR; INTRAVENOUS at 03:58

## 2021-06-27 RX ADMIN — SODIUM CHLORIDE, PRESERVATIVE FREE 10 ML: 5 INJECTION INTRAVENOUS at 09:01

## 2021-06-27 RX ADMIN — HEPARIN SODIUM 5000 UNITS: 5000 INJECTION INTRAVENOUS; SUBCUTANEOUS at 13:23

## 2021-06-28 LAB
ANION GAP SERPL CALCULATED.3IONS-SCNC: 8 MMOL/L (ref 5–15)
BASOPHILS # BLD AUTO: 0.03 10*3/MM3 (ref 0–0.2)
BASOPHILS NFR BLD AUTO: 0.4 % (ref 0–1.5)
BUN SERPL-MCNC: 26 MG/DL (ref 8–23)
BUN/CREAT SERPL: 13.8 (ref 7–25)
CALCIUM SPEC-SCNC: 8.8 MG/DL (ref 8.6–10.5)
CHLORIDE SERPL-SCNC: 105 MMOL/L (ref 98–107)
CO2 SERPL-SCNC: 27 MMOL/L (ref 22–29)
CREAT SERPL-MCNC: 1.88 MG/DL (ref 0.76–1.27)
DEPRECATED RDW RBC AUTO: 49.5 FL (ref 37–54)
EOSINOPHIL # BLD AUTO: 0.56 10*3/MM3 (ref 0–0.4)
EOSINOPHIL NFR BLD AUTO: 7.5 % (ref 0.3–6.2)
ERYTHROCYTE [DISTWIDTH] IN BLOOD BY AUTOMATED COUNT: 14.2 % (ref 12.3–15.4)
GFR SERPL CREATININE-BSD FRML MDRD: 34 ML/MIN/1.73
GLUCOSE SERPL-MCNC: 105 MG/DL (ref 65–99)
HCT VFR BLD AUTO: 34.7 % (ref 37.5–51)
HGB BLD-MCNC: 11.1 G/DL (ref 13–17.7)
IMM GRANULOCYTES # BLD AUTO: 0.03 10*3/MM3 (ref 0–0.05)
IMM GRANULOCYTES NFR BLD AUTO: 0.4 % (ref 0–0.5)
LYMPHOCYTES # BLD AUTO: 1.7 10*3/MM3 (ref 0.7–3.1)
LYMPHOCYTES NFR BLD AUTO: 22.8 % (ref 19.6–45.3)
MCH RBC QN AUTO: 30.4 PG (ref 26.6–33)
MCHC RBC AUTO-ENTMCNC: 32 G/DL (ref 31.5–35.7)
MCV RBC AUTO: 95.1 FL (ref 79–97)
MONOCYTES # BLD AUTO: 0.78 10*3/MM3 (ref 0.1–0.9)
MONOCYTES NFR BLD AUTO: 10.5 % (ref 5–12)
NEUTROPHILS NFR BLD AUTO: 4.35 10*3/MM3 (ref 1.7–7)
NEUTROPHILS NFR BLD AUTO: 58.4 % (ref 42.7–76)
NRBC BLD AUTO-RTO: 0 /100 WBC (ref 0–0.2)
PLATELET # BLD AUTO: 190 10*3/MM3 (ref 140–450)
PMV BLD AUTO: 11 FL (ref 6–12)
POTASSIUM SERPL-SCNC: 4.1 MMOL/L (ref 3.5–5.2)
RBC # BLD AUTO: 3.65 10*6/MM3 (ref 4.14–5.8)
SODIUM SERPL-SCNC: 140 MMOL/L (ref 136–145)
WBC # BLD AUTO: 7.45 10*3/MM3 (ref 3.4–10.8)

## 2021-06-28 PROCEDURE — 25010000002 HEPARIN (PORCINE) PER 1000 UNITS: Performed by: INTERNAL MEDICINE

## 2021-06-28 PROCEDURE — G0378 HOSPITAL OBSERVATION PER HR: HCPCS

## 2021-06-28 PROCEDURE — 94760 N-INVAS EAR/PLS OXIMETRY 1: CPT

## 2021-06-28 PROCEDURE — 80048 BASIC METABOLIC PNL TOTAL CA: CPT | Performed by: HOSPITALIST

## 2021-06-28 PROCEDURE — 85025 COMPLETE CBC W/AUTO DIFF WBC: CPT | Performed by: HOSPITALIST

## 2021-06-28 PROCEDURE — 97116 GAIT TRAINING THERAPY: CPT

## 2021-06-28 PROCEDURE — 94799 UNLISTED PULMONARY SVC/PX: CPT

## 2021-06-28 PROCEDURE — 97535 SELF CARE MNGMENT TRAINING: CPT

## 2021-06-28 PROCEDURE — 96372 THER/PROPH/DIAG INJ SC/IM: CPT

## 2021-06-28 PROCEDURE — 97530 THERAPEUTIC ACTIVITIES: CPT

## 2021-06-28 RX ADMIN — ROSUVASTATIN CALCIUM 20 MG: 20 TABLET, FILM COATED ORAL at 08:26

## 2021-06-28 RX ADMIN — SODIUM CHLORIDE, PRESERVATIVE FREE 10 ML: 5 INJECTION INTRAVENOUS at 20:00

## 2021-06-28 RX ADMIN — HEPARIN SODIUM 5000 UNITS: 5000 INJECTION INTRAVENOUS; SUBCUTANEOUS at 22:09

## 2021-06-28 RX ADMIN — DONEPEZIL HYDROCHLORIDE 10 MG: 10 TABLET ORAL at 20:00

## 2021-06-28 RX ADMIN — ASPIRIN 81 MG: 81 TABLET, CHEWABLE ORAL at 08:26

## 2021-06-28 RX ADMIN — MEMANTINE 10 MG: 5 TABLET ORAL at 08:25

## 2021-06-28 RX ADMIN — VERAPAMIL HYDROCHLORIDE 180 MG: 180 TABLET, FILM COATED, EXTENDED RELEASE ORAL at 08:25

## 2021-06-28 RX ADMIN — SODIUM CHLORIDE, PRESERVATIVE FREE 10 ML: 5 INJECTION INTRAVENOUS at 08:26

## 2021-06-28 RX ADMIN — ACETAMINOPHEN 650 MG: 325 TABLET, FILM COATED ORAL at 15:24

## 2021-06-28 RX ADMIN — HEPARIN SODIUM 5000 UNITS: 5000 INJECTION INTRAVENOUS; SUBCUTANEOUS at 15:24

## 2021-06-28 RX ADMIN — HEPARIN SODIUM 5000 UNITS: 5000 INJECTION INTRAVENOUS; SUBCUTANEOUS at 05:51

## 2021-06-29 VITALS
RESPIRATION RATE: 16 BRPM | HEART RATE: 60 BPM | HEIGHT: 70 IN | DIASTOLIC BLOOD PRESSURE: 70 MMHG | SYSTOLIC BLOOD PRESSURE: 154 MMHG | BODY MASS INDEX: 22.78 KG/M2 | TEMPERATURE: 97.6 F | WEIGHT: 159.1 LBS | OXYGEN SATURATION: 97 %

## 2021-06-29 PROCEDURE — G0378 HOSPITAL OBSERVATION PER HR: HCPCS

## 2021-06-29 PROCEDURE — 97530 THERAPEUTIC ACTIVITIES: CPT

## 2021-06-29 PROCEDURE — 25010000002 HEPARIN (PORCINE) PER 1000 UNITS: Performed by: INTERNAL MEDICINE

## 2021-06-29 PROCEDURE — 97110 THERAPEUTIC EXERCISES: CPT

## 2021-06-29 PROCEDURE — 97116 GAIT TRAINING THERAPY: CPT

## 2021-06-29 PROCEDURE — 94760 N-INVAS EAR/PLS OXIMETRY 1: CPT

## 2021-06-29 PROCEDURE — 94799 UNLISTED PULMONARY SVC/PX: CPT

## 2021-06-29 PROCEDURE — 96372 THER/PROPH/DIAG INJ SC/IM: CPT

## 2021-06-29 RX ADMIN — HEPARIN SODIUM 5000 UNITS: 5000 INJECTION INTRAVENOUS; SUBCUTANEOUS at 05:12

## 2021-06-29 RX ADMIN — SODIUM CHLORIDE, PRESERVATIVE FREE 10 ML: 5 INJECTION INTRAVENOUS at 08:00

## 2021-06-29 RX ADMIN — ROSUVASTATIN CALCIUM 20 MG: 20 TABLET, FILM COATED ORAL at 08:00

## 2021-06-29 RX ADMIN — MEMANTINE 10 MG: 5 TABLET ORAL at 08:00

## 2021-06-29 RX ADMIN — VERAPAMIL HYDROCHLORIDE 180 MG: 180 TABLET, FILM COATED, EXTENDED RELEASE ORAL at 08:00

## 2021-06-29 RX ADMIN — ASPIRIN 81 MG: 81 TABLET, CHEWABLE ORAL at 08:00

## 2021-06-29 RX ADMIN — HEPARIN SODIUM 5000 UNITS: 5000 INJECTION INTRAVENOUS; SUBCUTANEOUS at 14:20

## 2021-06-30 ENCOUNTER — READMISSION MANAGEMENT (OUTPATIENT)
Dept: CALL CENTER | Facility: HOSPITAL | Age: 86
End: 2021-06-30

## 2021-06-30 LAB
BACTERIA SPEC AEROBE CULT: NORMAL
BACTERIA SPEC AEROBE CULT: NORMAL

## 2021-06-30 NOTE — OUTREACH NOTE
Prep Survey      Responses   [facility] facility patient discharged from?  [facility]   Is LACE score < 7 ?  No   Emergency Room discharge w/ pulse ox?  No   Eligibility  Readm Mgmt   Discharge diagnosis  elevated lactic acid, generalized weakness, alzheimer's dementia, LUZ   Does the patient have one of the following disease processes/diagnoses(primary or secondary)?  Other   Does the patient have Home health ordered?  Yes   What is the Home health agency?   [facility]   Prep survey completed?  Yes          Ember Hernandez RN

## 2021-07-02 ENCOUNTER — READMISSION MANAGEMENT (OUTPATIENT)
Dept: CALL CENTER | Facility: HOSPITAL | Age: 86
End: 2021-07-02

## 2021-07-02 NOTE — PROGRESS NOTES
ATTN: DR IBARRA'S OFFICE- [facility]  [phone]  FAX# [phone]    PATIENT WAS D/C HOME ON 2021. INITIAL PLAN WAS NH. FAMILY DECIDED PRIOR TO LEAVING THE HOSPITAL THAT THEY DID NOT WANT TO CONSIDER NH. FAMILY REQUESTED HOME HEALTH. PROVIDER ENTERED HOME HEALTH ORDERS AFTER D/C SUMMARY WAS ENTERED. PATIENT DID NOT D/C TO A NH AND WOULD BENEFIT FROM HOME HEALTH SERVICES.         [address] [address]  Phone:  [phone]  Fax:  [phone] Date: 2021      Ambulatory Referral to Home Health     Patient:  George Puga Jr. MRN:  [id number]   [address]  [facility] [address] :  1935  SSN:    Phone: [phone] Sex:  M      INSURANCE PAYOR PLAN GROUP # SUBSCRIBER ID   Primary:  Secondary:    MEDICARE TRICARE FOR LIFE [id number]  [id number]      [id number]  [id number]      Referring Provider Information:  ARNALDO ORLANDO Phone: [phone] Fax: [phone]      Referral Information:   # Visits:  999 Referral Type: Home Health [42]   Urgency:  Routine Referral Reason: Specialty Services Required   Start Date: 2021 End Date:  To be determined by Insurer   Diagnosis: Impaired mobility and ADLs (Z74.09,Z78.9 [ICD-10-CM] V49.89 [ICD-9-CM])  Impaired functional mobility, balance, gait, and endurance (Z74.09 [ICD-10-CM] V49.89 [ICD-9-CM])  Alzheimer's dementia without behavioral disturbance, unspecified timing of dementia onset (CMS/[facility]) (G30.9,F02.80 [ICD-10-CM] 331.0,294.10 [ICD-9-CM])      Refer to Dept:   Refer to Provider:   Refer to Facility:       Face to Face Visit Date: 2021  Follow-up provider for Plan of Care? I treated the patient in an acute care facility and will not continue treatment after discharge.  Follow-up provider: NO KNOWN PROVIDER [[id number]]  Reason/Clinical Findings: deconditioning  Describe mobility limitations that make leaving home difficult: deconditioning  Nursing/Therapeutic Services  Requested: Skilled Nursing  Nursing/Therapeutic Services Requested: Physical Therapy  Nursing/Therapeutic Services Requested: Occupational Therapy  Skilled nursing orders: Mental health  PT orders: Therapeutic exercise  Occupational orders: Activities of daily living  Frequency: 1 Week 1     This document serves as a request of services and does not constitute Insurance authorization or approval of services.  To determine eligibility, please contact the members Insurance carrier to verify and review coverage.     If you have medical questions regarding this request for services. Please contact [address] at [phone] during normal business hours.       Authorizing Provider:Faustino Villagran MD  Authorizing Provider's NPI: [id number]  Order Entered By: Faustino Villagran MD 6/29/2021  4:04 PM     Electronically signed by: Faustino Villagran MD 6/29/2021  4:04 PM            After Visit Summary  George Puga Jr.  MRN: [id number]    Icon primary diagnosis        Increased lactic acid level  Icon Date   6/25/2021 - 6/29/2021  Icon Location   [address]   After Visit Summary  Instructions     Icon medication changes this visit         Your medications have changed    Icon medications to change how you take   CHANGE how you take:   aspirin -- Another medication with the same name was removed. Continue taking this medication, and follow the directions you see here.  Icon medications to stop taking   STOP taking:   lisinopril 10 MG tablet 20 mg, hydrochlorothiazide 25 MG tablet 25 mg    vitamin B-12 1000 MCG tablet (CYANOCOBALAMIN)   Review your updated medication list below.  Icon Checklist header    Your Next Steps    Icon read   Read   Icon Checkbox    Read these attachments  1 Acute Kidney Injury  Adult (English)  Why Get Vaccinated?  Building defenses against COVID-19 is a team effort. And you are a adames part of that defense.  Getting the COVID-19 vaccine adds one  more layer of protection for you, your coworkers, and family.  Here are ways you can build people’s confidence in the COVID-19 vaccines in your community and at home.     · Get vaccinated and enroll in the [employer] text messaging program to help CDC monitor vaccine safety.   · Tell others why you are getting vaccinated and encourage them to get vaccinated.  Share your success story.    · Learn how to have conversations about COVID-19 vaccine with coworkers, family, and friends.     How do I schedule an appointment for a vaccine?  https://www.vaccines.gov/ helps you find locations that carry COVID-19 vaccines and their contact information. Because every location handles appointments differently, you will need to schedule your appointment directly with the location you choose.        If you have any questions about your recovery, please call the [facility] Nurse Call Center at [phone]. A registered nurse is available 24 hours a day 7 days a week to assist you.   If you have any COVID-19 related questions, please call [phone].   Icon activity    Activity instructions    Activity as Tolerated   Other Activity Instructions   Activity Instructions: PT/OT evaluate and treat.   Icon diet    Diet instructions    Advance Diet As Tolerated    Icon Orders placed today                 Other instructions    Discharge Follow-up with PCP   Currently Documented PCP:   Anthony Mack MD   PCP Phone Number:   [phone]   What's Next    What's Next          Follow up with Anthony Mack MD  1-2 weeks [address] [place] [address] [phone]          Follow up with Anthony Mack MD [address] [place] [address] [phone]          Ambulatory Referral to Home Health   Home Health Services    Your Allergies  Date Reviewed: 6/26/2021  Your Allergies   Allergen Reactions   Codeine Other (See Comments)   Unknown reaction; patient's wife stated that patient is allergic to medication.     Patient Belongings Returned    Document Return of Belongings Flowsheet    Were the patient bedside belongings sent home? Yes   Medications Retrieved from Pharmacy & Sent Home No (Comment)   Belongings Sent to Safe None   Belongings sent with: --   Belongings Retrieved from Security & Sent Home N/A       MyChart Signup    Our records indicate that you have declined [facility] signup. If you would like to sign up for [employer], please email [email or web address] or call [phone] to obtain an activation code.  Medication List  Medication List     Morning Afternoon Evening Bedtime As Needed   Icon medications to change how you take   aspirin 81 MG chewable tablet  Chew 81 mg Daily.  What changed: Another medication with the same name was removed. Continue taking this medication, and follow the directions you see here.  Last time this was given: 81 mg on June 29, 2021  8:00 AM Next dose due 06/30/2021 at 9:00 a.m.        Crestor 20 MG tablet  Take 20 mg by mouth.  Generic drug: rosuvastatin  Last time this was given: 20 mg on June 29, 2021  8:00 AM  Notes to patient: Take as directed         donepezil 10 MG tablet  Commonly known as: ARICEPT  Take 10 mg by mouth.  Last time this was given: 10 mg on June 28, 2021  8:00 PM  Notes to patient: Take as directed         levothyroxine 150 MCG tablet  Commonly known as: SYNTHROID, LEVOTHROID  Take 150 mcg by mouth Daily. Next dose due 06/30/2021 at 9:00 a.m.        memantine 10 MG tablet  Commonly known as: NAMENDA  Take 10 mg by mouth.  Last time this was given: 10 mg on June 29, 2021  8:00 AM  Notes to patient: Take as directed         QUEtiapine 25 MG tablet  Commonly known as: SEROquel  Take by mouth 2 (Two) Times a Day. Pt takes one tablet at supper and one tablet an hour before bedtime.   Next dose due 06/29/2021 at 5:00 p.m before supper      risperiDONE 0.5 MG tablet  Commonly known as: risperDAL  Take 0.5 mg by mouth 2 (Two) Times a Day.    Next  dose due 06/29/2021 at 9:00 p.m.     sertraline 100 MG tablet  Commonly known as: ZOLOFT  Take 100 mg by mouth Daily. Next dose due 06/30/2021 at 9:00 a.m.        verapamil  MG 24 hr capsule  Commonly known as: VERELAN  Take 180 mg by mouth.  Last time this was given: Ask your nurse or doctor  Notes to patient: Take as directed        Always carry an updated list of your medications with you. If there is an emergency, a responder can quickly see what medications you are taking. Take this paperwork with you the next time you see your health care provider.        Icon List of Attachments     Attached Information  Acute Kidney Injury  Adult (English)  Acute Kidney Injury, Adult  ?     Acute kidney injury is a sudden worsening of kidney function. The kidneys are organs that have several jobs. They filter the blood to remove waste products and extra fluid. They also maintain a healthy balance of minerals and hormones in the body, which helps control blood pressure and keep bones strong. With this condition, your kidneys do not do their jobs as well as they should.  This condition ranges from mild to severe. Over time, it may develop into long-lasting (chronic) kidney disease. Early detection and treatment may prevent acute kidney injury from developing into a chronic condition.  What are the causes?  Common causes of this condition include:  · A problem with blood flow to the kidneys. This may be caused by:  ? Low blood pressure (hypotension) or shock.  ? Blood loss.  ? Heart and blood vessel (cardiovascular) disease.  ? Severe burns.  ? Liver disease.  · Direct damage to the kidneys. This may be caused by:  ? Certain medicines.  ? A kidney infection.  ? Poisoning.  ? Being around or in contact with toxic substances.  ? A surgical wound.  ? A hard, direct hit to the kidney area.  · A sudden blockage of urine flow. This may be caused by:  ? Cancer.  ? Kidney stones.  ? An enlarged prostate in males.  What increases  the risk?  You are more likely to develop this condition if you:  · Are older than age 65.  · Are female.  · Are hospitalized, especially if you are in critical condition.  · Have certain conditions, such as:  ? Chronic kidney disease.  ? Diabetes.  ? Coronary artery disease and heart failure.  ? Pulmonary disease.  ? Chronic liver disease.  What are the signs or symptoms?  Symptoms of this condition may not be obvious until the condition becomes severe. Symptoms of this condition can include:  · Tiredness (lethargy) or difficulty staying awake.  · Nausea or vomiting.  · Swelling (edema) of the face, legs, ankles, or feet.  · Problems with urination, such as:  ? Pain in the abdomen, or pain along the side of your stomach (flank).  ? Producing little or no urine.  ? Passing urine with a weak flow.  · Muscle twitches and cramps, especially in the legs.  · Confusion or trouble concentrating.  · Loss of appetite.  · Fever.  How is this diagnosed?  Your health care provider can diagnose this condition based on your symptoms, medical history, and a physical exam.   You may also have other tests, such as:  · Blood tests.  · Urine tests.  · Imaging tests.  · A test in which a sample of tissue is removed from the kidneys to be examined under a microscope (kidney biopsy).  How is this treated?  Treatment for this condition depends on the cause and how severe the condition is. In mild cases, treatment may not be needed. The kidneys may heal on their own. In more severe cases, treatment will involve:  · Treating the cause of the kidney injury. This may involve changing any medicines you are taking or adjusting your dosage.  · Fluids. You may need specialized IV fluids to balance your body's needs.  · Having a catheter placed to drain urine and prevent blockages.  · Preventing problems from occurring. This may mean avoiding certain medicines or procedures that can cause further injury to the kidneys.  In some cases, treatment  may also require:  · A procedure to remove toxic wastes from the body (dialysis or continuous renal replacement therapy, CRRT).  · Surgery. This may be done to repair a torn kidney or to remove the blockage from the urinary system.  Follow these instructions at home:  Medicines  · Take over-the-counter and prescription medicines only as told by your health care provider.  · Do not take any new medicines without your health care provider's approval. Many medicines can worsen your kidney damage.  · Do not take any vitamin and mineral supplements without your health care provider's approval. Many nutritional supplements can worsen your kidney damage.  Lifestyle  ?     · If your health care provider prescribed changes to your diet, follow them. You may need to decrease the amount of protein you eat.  · Achieve and maintain a healthy weight. If you need help with this, ask your health care provider.  · Start or continue an exercise plan. Try to exercise at least 30 minutes a day, 5 days a week.  · Do not use any products that contain nicotine or tobacco, such as cigarettes, e-cigarettes, and chewing tobacco. If you need help quitting, ask your health care provider.  General instructions  ?     · Keep track of your blood pressure. Report changes in your blood pressure as told by your health care provider.  · Stay up to date with your vaccines. Ask your health care provider which vaccines you need.  · Keep all follow-up visits as told by your health care provider. This is important.  Where to find more information  · American Association of Kidney Patients: www.aakp.org  · National Kidney Foundation: www.kidney.org  · American Kidney Fund: www.akfinc.org  · Life Options Rehabilitation Program:  ? www.lifeoptions.org  ? www.kidneyschool.org  Contact a health care provider if:  · Your symptoms get worse.  · You develop new symptoms.  Get help right away if:  · You develop symptoms of worsening kidney disease, which  include:  ? Headaches.  ? Abnormally dark or light skin.  ? Easy bruising.  ? Frequent hiccups.  ? Chest pain.  ? Shortness of breath.  ? End of menstruation in women.  ? Seizures.  ? Confusion or altered mental status.  ? Abdominal or back pain.  ? Itchiness.  · You have a fever.  · Your body is producing less urine.  · You have pain or bleeding when you urinate.  Summary  · Acute kidney injury is a sudden worsening of kidney function.  · Acute kidney injury can be caused by problems with blood flow to the kidneys, direct damage to the kidneys, and sudden blockage of urine flow.  · Symptoms of this condition may not be obvious until it becomes severe. Symptoms may include edema, lethargy, confusion, nausea or vomiting, and problems passing urine.  · This condition can be diagnosed with blood tests, urine tests, and imaging tests. Sometimes a kidney biopsy is done to diagnose this condition.  · Treatment for this condition often involves treating the underlying cause. It is treated with fluids, medicines, diet changes, dialysis, or surgery.  This information is not intended to replace advice given to you by your health care provider. Make sure you discuss any questions you have with your health care provider.  Document Revised: 10/27/2020 Document Reviewed: 10/27/2020  [employer] Patient Education © 2021 [employer]     Pneumonia Vaccination    Please follow up with your primary care provider or retail pharmacy to see if you are eligible for a pneumonia vaccination.        Opioid Resource    If you or someone you know needs information on substance abuse, please visit   https://www.findhelpnowky.org/ for listings of facilities and resources across [place].  Stroke Symptoms    · Call 911 or have someone take you to the Emergency Department if you have any of the following:  · Sudden numbness or weakness of your face, arm or leg especially on one side of the body  · Sudden confusion, difficulty speaking or trouble  understanding   · Changes in your vision or loss of sight in one eye  · Sudden severe headache with no known cause  · Sudden dizziness, trouble walking, loss of balance or coordination     It is important to seek emergency care right away if you have further stroke symptoms. If you get emergency help quickly, the powerful clot-dissolving medicines can reduce the disabilities caused by a stroke.      For more information:  American Stroke Association  7-994-1-STROKE  www.strokeassociation.org  Smoking Cessation    IF YOU SMOKE OR USE TOBACCO PLEASE READ THE FOLLOWING:  Why is smoking bad for me?  Smoking increases the risk of heart disease, lung disease, vascular disease, stroke, and cancer. If you smoke, STOP!     For more information:  Quit Now [place]  1-800-QUIT-NOW  https://[facility].meinKauflogTopmall.org/en-US/  Suicidal Feelings    If you feel like life is too tough and are thinking of suicide or injuring yourself, get help right away!  · Call 911  · Call a suicide hotline to speak to a counselor. 8-917-980-TALK or 4-284-LLEFYRS   Patient Experience    Thank you for choosing [facility]. You may receive a survey following your visit. Please take a moment to share what went well, where we need improvement, and which staff members deserve recognition. We value your input.           ? ?              YOU ARE THE MOST IMPORTANT FACTOR IN YOUR RECOVERY.      Follow all instructions carefully.      I have reviewed my discharge instructions with my nurse, including the following information, if applicable:                 Information about my illness and diagnosis              Follow up appointments (including lab draws)              Wound Care              Equipment Needs              Medications (new and continuing) along with side effects              Preventative information such as vaccines and smoking cessations              Diet              Pain              I know when to contact my Doctor's office or seek  emergency care        I want my nurse to describe the side effects of my medications: YES NO   If the answer is no, I understand the side effects of my medications: YES NO   My nurse described the side effects of my medications in a way that I could understand: YES NO   I have taken my personal belongings and my own medications with me at discharge: YES NO       I have received this information and my questions have been answered. I have discussed any concerns I see with this plan with the nurse or physician. I understand these instructions.     Signature of Patient or Responsible Person: _____________________________________     Date: _________________  Time: __________________     Signature of Healthcare Provider: _______________________________________  Date: _________________  Time: __________________

## 2021-07-02 NOTE — OUTREACH NOTE
Medical Week 1 Survey      Responses   [facility] patient discharged from?  [place]   Does the patient have one of the following disease processes/diagnoses(primary or secondary)?  Other   Week 1 attempt successful?  Yes   Call start time  1631   Call end time  1636   Discharge diagnosis  elevated lactic acid, generalized weakness, alzheimer's dementia, LUZ   Is patient permission given to speak with other caregiver?  Yes   Person spoke with today (if not patient) and relationship  wife Mayda Dahl reviewed with patient/caregiver?  Yes   Is the patient having any side effects they believe may be caused by any medication additions or changes?  No   Does the patient have all medications ordered at discharge?  Yes   Is the patient taking all medications as directed (includes completed medication regime)?  Yes   Does the patient have a primary care provider?   Yes   Does the patient have an appointment with their PCP within 7 days of discharge?  No   Comments regarding PCP  Dr Mack   What is preventing the patient from scheduling follow up appointments within 7 days of discharge?  [place]'t had time   Nursing Interventions  Educated patient on importance of making appointment, Advised patient to make appointment   Has the patient kept scheduled appointments due by today?  N/A   What is the Home health agency?   [facility]   Has home health visited the patient within 72 hours of discharge?  No [[facility] will not come to patient's area they live ]   Psychosocial issues?  No   Did the patient receive a copy of their discharge instructions?  Yes   Nursing interventions  Reviewed instructions with patient   What is the patient's perception of their health status since discharge?  Improving   Is the patient/caregiver able to teach back signs and symptoms related to disease process for when to call PCP?  Yes   Is the patient/caregiver able to teach back signs and symptoms related to disease process for when to call 911?  Yes    Is the patient/caregiver able to teach back the hierarchy of who to call/visit for symptoms/problems? PCP, Specialist, Home health nurse, Urgent Care, ED, 911  Yes   If the patient is a current smoker, are they able to teach back resources for cessation?  Not a smoker   Week 1 call completed?  Yes          Rene Reyes RN

## 2021-07-13 ENCOUNTER — READMISSION MANAGEMENT (OUTPATIENT)
Dept: CALL CENTER | Facility: HOSPITAL | Age: 86
End: 2021-07-13

## 2021-07-13 NOTE — OUTREACH NOTE
Medical Week 2 Survey      Responses   [facility] patient discharged from?  [place]   Does the patient have one of the following disease processes/diagnoses(primary or secondary)?  Other   Week 2 attempt successful?  Yes   Call start time  1330   Discharge diagnosis  elevated lactic acid, generalized weakness, alzheimer's dementia, LUZ   Call end time  1334   Is patient permission given to speak with other caregiver?  Yes   Person spoke with today (if not patient) and relationship  wife Mayda Dahl reviewed with patient/caregiver?  Yes   Is the patient having any side effects they believe may be caused by any medication additions or changes?  No   Does the patient have all medications ordered at discharge?  Yes   Is the patient taking all medications as directed (includes completed medication regime)?  Yes   Does the patient have a primary care provider?   Yes   Comments regarding PCP  Dr Mack   Has the patient kept scheduled appointments due by today?  N/A   Comments  Encouraged pcp appt this week.   What is the Home health agency?   [facility]   Has home health visited the patient within 72 hours of discharge?  N/A   Psychosocial issues?  No   Did the patient receive a copy of their discharge instructions?  Yes   Nursing interventions  Reviewed instructions with patient   What is the patient's perception of their health status since discharge?  Improving   Is the patient/caregiver able to teach back signs and symptoms related to disease process for when to call PCP?  Yes   Is the patient/caregiver able to teach back signs and symptoms related to disease process for when to call 911?  Yes   Is the patient/caregiver able to teach back the hierarchy of who to call/visit for symptoms/problems? PCP, Specialist, Home health nurse, Urgent Care, ED, 911  Yes   If the patient is a current smoker, are they able to teach back resources for cessation?  Not a smoker   Additional teach back comments  He sleeps most of the  day, he eats a good meal at night but that is his only meal. Urinates in the am, unsure of more.     Week 2 Call Completed?  Yes   Wrap up additional comments  He is no better she says.  Encouraged more water.          Joselyn Mcleod RN

## 2021-07-21 ENCOUNTER — READMISSION MANAGEMENT (OUTPATIENT)
Dept: CALL CENTER | Facility: HOSPITAL | Age: 86
End: 2021-07-21

## 2021-07-21 NOTE — OUTREACH NOTE
Medical Week 3 Survey      Responses   [facility] patient discharged from?  [place]   Does the patient have one of the following disease processes/diagnoses(primary or secondary)?  Other   Week 3 attempt successful?  No   Unsuccessful attempts  Attempt 1          Precious Mueller RN

## 2021-11-08 ENCOUNTER — LAB REQUISITION (OUTPATIENT)
Dept: LAB | Facility: HOSPITAL | Age: 86
End: 2021-11-08

## 2021-11-08 DIAGNOSIS — E03.9 HYPOTHYROIDISM, UNSPECIFIED: ICD-10-CM

## 2021-11-08 LAB — TSH SERPL DL<=0.05 MIU/L-ACNC: 3.13 UIU/ML (ref 0.27–4.2)

## 2021-11-08 PROCEDURE — 36415 COLL VENOUS BLD VENIPUNCTURE: CPT | Performed by: NURSE PRACTITIONER

## 2021-11-08 PROCEDURE — 84443 ASSAY THYROID STIM HORMONE: CPT | Performed by: NURSE PRACTITIONER

## 2021-11-15 ENCOUNTER — LAB REQUISITION (OUTPATIENT)
Dept: LAB | Facility: HOSPITAL | Age: 86
End: 2021-11-15

## 2021-11-15 DIAGNOSIS — Z51.81 ENCOUNTER FOR THERAPEUTIC DRUG LEVEL MONITORING: ICD-10-CM

## 2021-11-15 LAB — 25(OH)D3 SERPL-MCNC: 14.9 NG/ML (ref 30–100)

## 2021-11-15 PROCEDURE — 82306 VITAMIN D 25 HYDROXY: CPT | Performed by: NURSE PRACTITIONER

## 2021-11-15 PROCEDURE — 36415 COLL VENOUS BLD VENIPUNCTURE: CPT | Performed by: NURSE PRACTITIONER

## 2021-11-22 ENCOUNTER — LAB REQUISITION (OUTPATIENT)
Dept: LAB | Facility: HOSPITAL | Age: 86
End: 2021-11-22

## 2021-11-22 DIAGNOSIS — G30.9 ALZHEIMER'S DISEASE, UNSPECIFIED (CODE) (HCC): ICD-10-CM

## 2021-11-22 DIAGNOSIS — I10 ESSENTIAL (PRIMARY) HYPERTENSION: ICD-10-CM

## 2021-11-22 LAB
ALBUMIN SERPL-MCNC: 3.7 G/DL (ref 3.5–5.2)
ALBUMIN/GLOB SERPL: 1.1 G/DL
ALP SERPL-CCNC: 77 U/L (ref 39–117)
ALT SERPL W P-5'-P-CCNC: 16 U/L (ref 1–41)
ANION GAP SERPL CALCULATED.3IONS-SCNC: 11 MMOL/L (ref 5–15)
AST SERPL-CCNC: 28 U/L (ref 1–40)
BASOPHILS # BLD AUTO: 0.05 10*3/MM3 (ref 0–0.2)
BASOPHILS NFR BLD AUTO: 0.8 % (ref 0–1.5)
BILIRUB SERPL-MCNC: 0.2 MG/DL (ref 0–1.2)
BUN SERPL-MCNC: 55 MG/DL (ref 8–23)
BUN/CREAT SERPL: 20.6 (ref 7–25)
CALCIUM SPEC-SCNC: 9.2 MG/DL (ref 8.6–10.5)
CHLORIDE SERPL-SCNC: 106 MMOL/L (ref 98–107)
CO2 SERPL-SCNC: 24 MMOL/L (ref 22–29)
CREAT SERPL-MCNC: 2.67 MG/DL (ref 0.76–1.27)
DEPRECATED RDW RBC AUTO: 49.9 FL (ref 37–54)
EOSINOPHIL # BLD AUTO: 0.7 10*3/MM3 (ref 0–0.4)
EOSINOPHIL NFR BLD AUTO: 10.7 % (ref 0.3–6.2)
ERYTHROCYTE [DISTWIDTH] IN BLOOD BY AUTOMATED COUNT: 14.2 % (ref 12.3–15.4)
GFR SERPL CREATININE-BSD FRML MDRD: 23 ML/MIN/1.73
GLOBULIN UR ELPH-MCNC: 3.4 GM/DL
GLUCOSE SERPL-MCNC: 95 MG/DL (ref 65–99)
HCT VFR BLD AUTO: 30.6 % (ref 37.5–51)
HGB BLD-MCNC: 9.8 G/DL (ref 13–17.7)
IMM GRANULOCYTES # BLD AUTO: 0.03 10*3/MM3 (ref 0–0.05)
IMM GRANULOCYTES NFR BLD AUTO: 0.5 % (ref 0–0.5)
LYMPHOCYTES # BLD AUTO: 1.63 10*3/MM3 (ref 0.7–3.1)
LYMPHOCYTES NFR BLD AUTO: 25 % (ref 19.6–45.3)
MCH RBC QN AUTO: 30.9 PG (ref 26.6–33)
MCHC RBC AUTO-ENTMCNC: 32 G/DL (ref 31.5–35.7)
MCV RBC AUTO: 96.5 FL (ref 79–97)
MONOCYTES # BLD AUTO: 0.82 10*3/MM3 (ref 0.1–0.9)
MONOCYTES NFR BLD AUTO: 12.6 % (ref 5–12)
NEUTROPHILS NFR BLD AUTO: 3.3 10*3/MM3 (ref 1.7–7)
NEUTROPHILS NFR BLD AUTO: 50.4 % (ref 42.7–76)
NRBC BLD AUTO-RTO: 0 /100 WBC (ref 0–0.2)
PLATELET # BLD AUTO: 195 10*3/MM3 (ref 140–450)
PMV BLD AUTO: 12.2 FL (ref 6–12)
POTASSIUM SERPL-SCNC: 5.6 MMOL/L (ref 3.5–5.2)
PROT SERPL-MCNC: 7.1 G/DL (ref 6–8.5)
RBC # BLD AUTO: 3.17 10*6/MM3 (ref 4.14–5.8)
SODIUM SERPL-SCNC: 141 MMOL/L (ref 136–145)
WBC NRBC COR # BLD: 6.53 10*3/MM3 (ref 3.4–10.8)

## 2021-11-22 PROCEDURE — 85025 COMPLETE CBC W/AUTO DIFF WBC: CPT | Performed by: NURSE PRACTITIONER

## 2021-11-22 PROCEDURE — 36415 COLL VENOUS BLD VENIPUNCTURE: CPT | Performed by: NURSE PRACTITIONER

## 2021-11-22 PROCEDURE — 80053 COMPREHEN METABOLIC PANEL: CPT | Performed by: NURSE PRACTITIONER

## 2021-11-23 ENCOUNTER — LAB REQUISITION (OUTPATIENT)
Dept: LAB | Facility: HOSPITAL | Age: 86
End: 2021-11-23

## 2021-11-23 DIAGNOSIS — I10 ESSENTIAL (PRIMARY) HYPERTENSION: ICD-10-CM

## 2021-11-23 LAB
ALBUMIN SERPL-MCNC: 4.2 G/DL (ref 3.5–5.2)
ALBUMIN/GLOB SERPL: 1.3 G/DL
ALP SERPL-CCNC: 77 U/L (ref 39–117)
ALT SERPL W P-5'-P-CCNC: 16 U/L (ref 1–41)
ANION GAP SERPL CALCULATED.3IONS-SCNC: 7 MMOL/L (ref 5–15)
AST SERPL-CCNC: 24 U/L (ref 1–40)
BILIRUB SERPL-MCNC: 0.2 MG/DL (ref 0–1.2)
BUN SERPL-MCNC: 55 MG/DL (ref 8–23)
BUN/CREAT SERPL: 22.9 (ref 7–25)
CALCIUM SPEC-SCNC: 9.2 MG/DL (ref 8.6–10.5)
CHLORIDE SERPL-SCNC: 106 MMOL/L (ref 98–107)
CO2 SERPL-SCNC: 26 MMOL/L (ref 22–29)
CREAT SERPL-MCNC: 2.4 MG/DL (ref 0.76–1.27)
GFR SERPL CREATININE-BSD FRML MDRD: 26 ML/MIN/1.73
GLOBULIN UR ELPH-MCNC: 3.2 GM/DL
GLUCOSE SERPL-MCNC: 105 MG/DL (ref 65–99)
POTASSIUM SERPL-SCNC: 5.8 MMOL/L (ref 3.5–5.2)
PROT SERPL-MCNC: 7.4 G/DL (ref 6–8.5)
SODIUM SERPL-SCNC: 139 MMOL/L (ref 136–145)

## 2021-11-23 PROCEDURE — 80053 COMPREHEN METABOLIC PANEL: CPT | Performed by: NURSE PRACTITIONER

## 2021-11-24 ENCOUNTER — LAB REQUISITION (OUTPATIENT)
Dept: LAB | Facility: HOSPITAL | Age: 86
End: 2021-11-24

## 2021-11-24 DIAGNOSIS — E87.5 HYPERKALEMIA: ICD-10-CM

## 2021-11-24 LAB — POTASSIUM SERPL-SCNC: 4.6 MMOL/L (ref 3.5–5.2)

## 2021-11-24 PROCEDURE — 36415 COLL VENOUS BLD VENIPUNCTURE: CPT | Performed by: NURSE PRACTITIONER

## 2021-11-24 PROCEDURE — 84132 ASSAY OF SERUM POTASSIUM: CPT | Performed by: NURSE PRACTITIONER

## 2021-12-07 ENCOUNTER — LAB REQUISITION (OUTPATIENT)
Dept: LAB | Facility: HOSPITAL | Age: 86
End: 2021-12-07

## 2021-12-07 DIAGNOSIS — E87.5 HYPERKALEMIA: ICD-10-CM

## 2021-12-07 LAB — POTASSIUM SERPL-SCNC: 5 MMOL/L (ref 3.5–5.2)

## 2021-12-07 PROCEDURE — 84132 ASSAY OF SERUM POTASSIUM: CPT | Performed by: NURSE PRACTITIONER

## 2021-12-09 ENCOUNTER — LAB REQUISITION (OUTPATIENT)
Dept: LAB | Facility: HOSPITAL | Age: 86
End: 2021-12-09

## 2021-12-09 DIAGNOSIS — E87.5 HYPERKALEMIA: ICD-10-CM

## 2021-12-09 LAB
ANION GAP SERPL CALCULATED.3IONS-SCNC: 7 MMOL/L (ref 5–15)
BUN SERPL-MCNC: 37 MG/DL (ref 8–23)
BUN/CREAT SERPL: 17.8 (ref 7–25)
CALCIUM SPEC-SCNC: 9 MG/DL (ref 8.6–10.5)
CHLORIDE SERPL-SCNC: 104 MMOL/L (ref 98–107)
CO2 SERPL-SCNC: 26 MMOL/L (ref 22–29)
CREAT SERPL-MCNC: 2.08 MG/DL (ref 0.76–1.27)
GFR SERPL CREATININE-BSD FRML MDRD: 30 ML/MIN/1.73
GLUCOSE SERPL-MCNC: 95 MG/DL (ref 65–99)
POTASSIUM SERPL-SCNC: 4.7 MMOL/L (ref 3.5–5.2)
SODIUM SERPL-SCNC: 137 MMOL/L (ref 136–145)

## 2021-12-09 PROCEDURE — 36415 COLL VENOUS BLD VENIPUNCTURE: CPT | Performed by: NURSE PRACTITIONER

## 2021-12-09 PROCEDURE — 80048 BASIC METABOLIC PNL TOTAL CA: CPT | Performed by: NURSE PRACTITIONER

## 2022-01-12 ENCOUNTER — APPOINTMENT (OUTPATIENT)
Dept: CT IMAGING | Facility: HOSPITAL | Age: 87
End: 2022-01-12

## 2022-01-12 ENCOUNTER — HOSPITAL ENCOUNTER (OUTPATIENT)
Facility: HOSPITAL | Age: 87
Setting detail: OBSERVATION
Discharge: NURSING, REHAB OR LONG-TERM CARE | End: 2022-01-14
Attending: EMERGENCY MEDICINE | Admitting: INTERNAL MEDICINE

## 2022-01-12 ENCOUNTER — APPOINTMENT (OUTPATIENT)
Dept: GENERAL RADIOLOGY | Facility: HOSPITAL | Age: 87
End: 2022-01-12

## 2022-01-12 DIAGNOSIS — N17.9 ACUTE RENAL FAILURE, UNSPECIFIED ACUTE RENAL FAILURE TYPE: Primary | ICD-10-CM

## 2022-01-12 DIAGNOSIS — R41.82 ALTERED MENTAL STATUS, UNSPECIFIED ALTERED MENTAL STATUS TYPE: ICD-10-CM

## 2022-01-12 DIAGNOSIS — R13.11 ORAL PHASE DYSPHAGIA: ICD-10-CM

## 2022-01-12 LAB
ALBUMIN SERPL-MCNC: 4.1 G/DL (ref 3.5–5.2)
ALBUMIN/GLOB SERPL: 1.1 G/DL
ALP SERPL-CCNC: 105 U/L (ref 39–117)
ALT SERPL W P-5'-P-CCNC: 16 U/L (ref 1–41)
ANION GAP SERPL CALCULATED.3IONS-SCNC: 12 MMOL/L (ref 5–15)
ANION GAP SERPL CALCULATED.3IONS-SCNC: 12 MMOL/L (ref 5–15)
AST SERPL-CCNC: 32 U/L (ref 1–40)
BACTERIA UR QL AUTO: ABNORMAL /HPF
BASOPHILS # BLD AUTO: 0.03 10*3/MM3 (ref 0–0.2)
BASOPHILS # BLD AUTO: 0.03 10*3/MM3 (ref 0–0.2)
BASOPHILS NFR BLD AUTO: 0.3 % (ref 0–1.5)
BASOPHILS NFR BLD AUTO: 0.3 % (ref 0–1.5)
BILIRUB SERPL-MCNC: 0.3 MG/DL (ref 0–1.2)
BILIRUB UR QL STRIP: NEGATIVE
BUN SERPL-MCNC: 57 MG/DL (ref 8–23)
BUN SERPL-MCNC: 62 MG/DL (ref 8–23)
BUN/CREAT SERPL: 19.3 (ref 7–25)
BUN/CREAT SERPL: 21.2 (ref 7–25)
CALCIUM SPEC-SCNC: 8.9 MG/DL (ref 8.6–10.5)
CALCIUM SPEC-SCNC: 9.2 MG/DL (ref 8.6–10.5)
CHLORIDE SERPL-SCNC: 108 MMOL/L (ref 98–107)
CHLORIDE SERPL-SCNC: 109 MMOL/L (ref 98–107)
CLARITY UR: CLEAR
CO2 SERPL-SCNC: 25 MMOL/L (ref 22–29)
CO2 SERPL-SCNC: 27 MMOL/L (ref 22–29)
COLOR UR: YELLOW
CRE SCREEN PCR: NOT DETECTED
CREAT SERPL-MCNC: 2.69 MG/DL (ref 0.76–1.27)
CREAT SERPL-MCNC: 3.21 MG/DL (ref 0.76–1.27)
D-LACTATE SERPL-SCNC: 0.9 MMOL/L (ref 0.5–2)
DEPRECATED RDW RBC AUTO: 47.8 FL (ref 37–54)
DEPRECATED RDW RBC AUTO: 48.1 FL (ref 37–54)
EOSINOPHIL # BLD AUTO: 0.18 10*3/MM3 (ref 0–0.4)
EOSINOPHIL # BLD AUTO: 0.22 10*3/MM3 (ref 0–0.4)
EOSINOPHIL NFR BLD AUTO: 2 % (ref 0.3–6.2)
EOSINOPHIL NFR BLD AUTO: 2.3 % (ref 0.3–6.2)
ERYTHROCYTE [DISTWIDTH] IN BLOOD BY AUTOMATED COUNT: 13.5 % (ref 12.3–15.4)
ERYTHROCYTE [DISTWIDTH] IN BLOOD BY AUTOMATED COUNT: 13.6 % (ref 12.3–15.4)
FLUAV RNA RESP QL NAA+PROBE: NOT DETECTED
FLUBV RNA RESP QL NAA+PROBE: NOT DETECTED
GFR SERPL CREATININE-BSD FRML MDRD: 18 ML/MIN/1.73
GFR SERPL CREATININE-BSD FRML MDRD: 23 ML/MIN/1.73
GLOBULIN UR ELPH-MCNC: 3.6 GM/DL
GLUCOSE SERPL-MCNC: 109 MG/DL (ref 65–99)
GLUCOSE SERPL-MCNC: 122 MG/DL (ref 65–99)
GLUCOSE UR STRIP-MCNC: NEGATIVE MG/DL
HBA1C MFR BLD: 5.7 % (ref 4.8–5.6)
HCT VFR BLD AUTO: 33 % (ref 37.5–51)
HCT VFR BLD AUTO: 33.3 % (ref 37.5–51)
HGB BLD-MCNC: 10.6 G/DL (ref 13–17.7)
HGB BLD-MCNC: 10.6 G/DL (ref 13–17.7)
HGB UR QL STRIP.AUTO: ABNORMAL
HYALINE CASTS UR QL AUTO: ABNORMAL /LPF
IMM GRANULOCYTES # BLD AUTO: 0.04 10*3/MM3 (ref 0–0.05)
IMM GRANULOCYTES # BLD AUTO: 0.04 10*3/MM3 (ref 0–0.05)
IMM GRANULOCYTES NFR BLD AUTO: 0.4 % (ref 0–0.5)
IMM GRANULOCYTES NFR BLD AUTO: 0.4 % (ref 0–0.5)
IMP STRAIN: NOT DETECTED
KETONES UR QL STRIP: NEGATIVE
KPC STRAIN: NOT DETECTED
LEUKOCYTE ESTERASE UR QL STRIP.AUTO: NEGATIVE
LYMPHOCYTES # BLD AUTO: 1.55 10*3/MM3 (ref 0.7–3.1)
LYMPHOCYTES # BLD AUTO: 1.82 10*3/MM3 (ref 0.7–3.1)
LYMPHOCYTES NFR BLD AUTO: 17.1 % (ref 19.6–45.3)
LYMPHOCYTES NFR BLD AUTO: 19.1 % (ref 19.6–45.3)
MAGNESIUM SERPL-MCNC: 2.7 MG/DL (ref 1.6–2.4)
MCH RBC QN AUTO: 30.6 PG (ref 26.6–33)
MCH RBC QN AUTO: 30.8 PG (ref 26.6–33)
MCHC RBC AUTO-ENTMCNC: 31.8 G/DL (ref 31.5–35.7)
MCHC RBC AUTO-ENTMCNC: 32.1 G/DL (ref 31.5–35.7)
MCV RBC AUTO: 95.9 FL (ref 79–97)
MCV RBC AUTO: 96.2 FL (ref 79–97)
MONOCYTES # BLD AUTO: 1.01 10*3/MM3 (ref 0.1–0.9)
MONOCYTES # BLD AUTO: 1.03 10*3/MM3 (ref 0.1–0.9)
MONOCYTES NFR BLD AUTO: 10.8 % (ref 5–12)
MONOCYTES NFR BLD AUTO: 11.1 % (ref 5–12)
NDM STRAIN: NOT DETECTED
NEUTROPHILS NFR BLD AUTO: 6.25 10*3/MM3 (ref 1.7–7)
NEUTROPHILS NFR BLD AUTO: 6.39 10*3/MM3 (ref 1.7–7)
NEUTROPHILS NFR BLD AUTO: 67.1 % (ref 42.7–76)
NEUTROPHILS NFR BLD AUTO: 69.1 % (ref 42.7–76)
NITRITE UR QL STRIP: NEGATIVE
NRBC BLD AUTO-RTO: 0 /100 WBC (ref 0–0.2)
NRBC BLD AUTO-RTO: 0 /100 WBC (ref 0–0.2)
NT-PROBNP SERPL-MCNC: 289.1 PG/ML (ref 0–1800)
OXA 48 STRAIN: NOT DETECTED
PH UR STRIP.AUTO: <=5 [PH] (ref 5–9)
PLATELET # BLD AUTO: 233 10*3/MM3 (ref 140–450)
PLATELET # BLD AUTO: 245 10*3/MM3 (ref 140–450)
PMV BLD AUTO: 11.1 FL (ref 6–12)
PMV BLD AUTO: 11.2 FL (ref 6–12)
POTASSIUM SERPL-SCNC: 4.5 MMOL/L (ref 3.5–5.2)
POTASSIUM SERPL-SCNC: 4.6 MMOL/L (ref 3.5–5.2)
PROCALCITONIN SERPL-MCNC: 0.15 NG/ML (ref 0–0.25)
PROT SERPL-MCNC: 7.7 G/DL (ref 6–8.5)
PROT UR QL STRIP: ABNORMAL
RBC # BLD AUTO: 3.44 10*6/MM3 (ref 4.14–5.8)
RBC # BLD AUTO: 3.46 10*6/MM3 (ref 4.14–5.8)
RBC # UR STRIP: ABNORMAL /HPF
REF LAB TEST METHOD: ABNORMAL
SARS-COV-2 RNA RESP QL NAA+PROBE: NOT DETECTED
SODIUM SERPL-SCNC: 146 MMOL/L (ref 136–145)
SODIUM SERPL-SCNC: 147 MMOL/L (ref 136–145)
SP GR UR STRIP: 1.02 (ref 1–1.03)
SQUAMOUS #/AREA URNS HPF: ABNORMAL /HPF
TROPONIN T SERPL-MCNC: 0.03 NG/ML (ref 0–0.03)
TROPONIN T SERPL-MCNC: 0.04 NG/ML (ref 0–0.03)
TSH SERPL DL<=0.05 MIU/L-ACNC: 4.71 UIU/ML (ref 0.27–4.2)
UROBILINOGEN UR QL STRIP: ABNORMAL
VIM STRAIN: NOT DETECTED
WBC # UR STRIP: ABNORMAL /HPF
WBC NRBC COR # BLD: 9.06 10*3/MM3 (ref 3.4–10.8)
WBC NRBC COR # BLD: 9.53 10*3/MM3 (ref 3.4–10.8)

## 2022-01-12 PROCEDURE — G0378 HOSPITAL OBSERVATION PER HR: HCPCS

## 2022-01-12 PROCEDURE — 71045 X-RAY EXAM CHEST 1 VIEW: CPT

## 2022-01-12 PROCEDURE — 92610 EVALUATE SWALLOWING FUNCTION: CPT | Performed by: SPEECH-LANGUAGE PATHOLOGIST

## 2022-01-12 PROCEDURE — 25010000002 HEPARIN (PORCINE) PER 1000 UNITS: Performed by: INTERNAL MEDICINE

## 2022-01-12 PROCEDURE — 99285 EMERGENCY DEPT VISIT HI MDM: CPT

## 2022-01-12 PROCEDURE — 83735 ASSAY OF MAGNESIUM: CPT | Performed by: EMERGENCY MEDICINE

## 2022-01-12 PROCEDURE — 70450 CT HEAD/BRAIN W/O DYE: CPT

## 2022-01-12 PROCEDURE — 87040 BLOOD CULTURE FOR BACTERIA: CPT | Performed by: EMERGENCY MEDICINE

## 2022-01-12 PROCEDURE — 96374 THER/PROPH/DIAG INJ IV PUSH: CPT

## 2022-01-12 PROCEDURE — 85025 COMPLETE CBC W/AUTO DIFF WBC: CPT | Performed by: INTERNAL MEDICINE

## 2022-01-12 PROCEDURE — 83036 HEMOGLOBIN GLYCOSYLATED A1C: CPT | Performed by: INTERNAL MEDICINE

## 2022-01-12 PROCEDURE — 36415 COLL VENOUS BLD VENIPUNCTURE: CPT | Performed by: INTERNAL MEDICINE

## 2022-01-12 PROCEDURE — 93005 ELECTROCARDIOGRAM TRACING: CPT | Performed by: EMERGENCY MEDICINE

## 2022-01-12 PROCEDURE — 81001 URINALYSIS AUTO W/SCOPE: CPT | Performed by: EMERGENCY MEDICINE

## 2022-01-12 PROCEDURE — 84443 ASSAY THYROID STIM HORMONE: CPT | Performed by: EMERGENCY MEDICINE

## 2022-01-12 PROCEDURE — 84484 ASSAY OF TROPONIN QUANT: CPT | Performed by: EMERGENCY MEDICINE

## 2022-01-12 PROCEDURE — 93010 ELECTROCARDIOGRAM REPORT: CPT | Performed by: INTERNAL MEDICINE

## 2022-01-12 PROCEDURE — 96361 HYDRATE IV INFUSION ADD-ON: CPT

## 2022-01-12 PROCEDURE — 85025 COMPLETE CBC W/AUTO DIFF WBC: CPT | Performed by: EMERGENCY MEDICINE

## 2022-01-12 PROCEDURE — 84145 PROCALCITONIN (PCT): CPT | Performed by: EMERGENCY MEDICINE

## 2022-01-12 PROCEDURE — C9803 HOPD COVID-19 SPEC COLLECT: HCPCS

## 2022-01-12 PROCEDURE — 96372 THER/PROPH/DIAG INJ SC/IM: CPT

## 2022-01-12 PROCEDURE — 87636 SARSCOV2 & INF A&B AMP PRB: CPT | Performed by: EMERGENCY MEDICINE

## 2022-01-12 PROCEDURE — 80053 COMPREHEN METABOLIC PANEL: CPT | Performed by: EMERGENCY MEDICINE

## 2022-01-12 PROCEDURE — 87081 CULTURE SCREEN ONLY: CPT | Performed by: NURSE PRACTITIONER

## 2022-01-12 PROCEDURE — 83880 ASSAY OF NATRIURETIC PEPTIDE: CPT | Performed by: EMERGENCY MEDICINE

## 2022-01-12 PROCEDURE — 83605 ASSAY OF LACTIC ACID: CPT | Performed by: EMERGENCY MEDICINE

## 2022-01-12 PROCEDURE — P9612 CATHETERIZE FOR URINE SPEC: HCPCS

## 2022-01-12 RX ORDER — AMLODIPINE BESYLATE 5 MG/1
5 TABLET ORAL
Status: DISCONTINUED | OUTPATIENT
Start: 2022-01-12 | End: 2022-01-15 | Stop reason: HOSPADM

## 2022-01-12 RX ORDER — SODIUM CHLORIDE 0.9 % (FLUSH) 0.9 %
10 SYRINGE (ML) INJECTION EVERY 12 HOURS SCHEDULED
Status: DISCONTINUED | OUTPATIENT
Start: 2022-01-12 | End: 2022-01-15 | Stop reason: HOSPADM

## 2022-01-12 RX ORDER — LORAZEPAM 0.5 MG/1
0.5 TABLET ORAL 2 TIMES DAILY
Status: DISCONTINUED | OUTPATIENT
Start: 2022-01-12 | End: 2022-01-12

## 2022-01-12 RX ORDER — POLYETHYLENE GLYCOL 3350 17 G/17G
17 POWDER, FOR SOLUTION ORAL DAILY
COMMUNITY

## 2022-01-12 RX ORDER — SODIUM CHLORIDE 0.9 % (FLUSH) 0.9 %
10 SYRINGE (ML) INJECTION AS NEEDED
Status: DISCONTINUED | OUTPATIENT
Start: 2022-01-12 | End: 2022-01-15 | Stop reason: HOSPADM

## 2022-01-12 RX ORDER — LEVOTHYROXINE SODIUM 0.15 MG/1
150 TABLET ORAL
Status: DISCONTINUED | OUTPATIENT
Start: 2022-01-12 | End: 2022-01-15 | Stop reason: HOSPADM

## 2022-01-12 RX ORDER — SODIUM CHLORIDE 450 MG/100ML
100 INJECTION, SOLUTION INTRAVENOUS CONTINUOUS
Status: DISCONTINUED | OUTPATIENT
Start: 2022-01-12 | End: 2022-01-15 | Stop reason: HOSPADM

## 2022-01-12 RX ORDER — ERGOCALCIFEROL 1.25 MG/1
50000 CAPSULE ORAL WEEKLY
COMMUNITY

## 2022-01-12 RX ORDER — ONDANSETRON 4 MG/1
4 TABLET, FILM COATED ORAL EVERY 6 HOURS PRN
Status: DISCONTINUED | OUTPATIENT
Start: 2022-01-12 | End: 2022-01-15 | Stop reason: HOSPADM

## 2022-01-12 RX ORDER — MELATONIN
1000 DAILY
Status: DISCONTINUED | OUTPATIENT
Start: 2022-01-12 | End: 2022-01-15 | Stop reason: HOSPADM

## 2022-01-12 RX ORDER — ACETAMINOPHEN 160 MG/5ML
650 SOLUTION ORAL EVERY 4 HOURS PRN
Status: DISCONTINUED | OUTPATIENT
Start: 2022-01-12 | End: 2022-01-15 | Stop reason: HOSPADM

## 2022-01-12 RX ORDER — LABETALOL HYDROCHLORIDE 5 MG/ML
20 INJECTION, SOLUTION INTRAVENOUS EVERY 6 HOURS PRN
Status: DISCONTINUED | OUTPATIENT
Start: 2022-01-12 | End: 2022-01-15 | Stop reason: HOSPADM

## 2022-01-12 RX ORDER — HEPARIN SODIUM 5000 [USP'U]/ML
5000 INJECTION, SOLUTION INTRAVENOUS; SUBCUTANEOUS EVERY 12 HOURS SCHEDULED
Status: DISCONTINUED | OUTPATIENT
Start: 2022-01-12 | End: 2022-01-15 | Stop reason: HOSPADM

## 2022-01-12 RX ORDER — CHLORTHALIDONE 25 MG/1
12.5 TABLET ORAL DAILY
Status: CANCELLED | OUTPATIENT
Start: 2022-01-12

## 2022-01-12 RX ORDER — ACETAMINOPHEN 325 MG/1
650 TABLET ORAL EVERY 4 HOURS PRN
Status: DISCONTINUED | OUTPATIENT
Start: 2022-01-12 | End: 2022-01-15 | Stop reason: HOSPADM

## 2022-01-12 RX ORDER — SENNA PLUS 8.6 MG/1
1 TABLET ORAL 2 TIMES DAILY
Status: DISCONTINUED | OUTPATIENT
Start: 2022-01-12 | End: 2022-01-15 | Stop reason: HOSPADM

## 2022-01-12 RX ORDER — ACETAMINOPHEN 650 MG/1
650 SUPPOSITORY RECTAL EVERY 4 HOURS PRN
Status: DISCONTINUED | OUTPATIENT
Start: 2022-01-12 | End: 2022-01-15 | Stop reason: HOSPADM

## 2022-01-12 RX ORDER — SODIUM CHLORIDE 9 MG/ML
125 INJECTION, SOLUTION INTRAVENOUS CONTINUOUS
Status: DISCONTINUED | OUTPATIENT
Start: 2022-01-12 | End: 2022-01-12

## 2022-01-12 RX ORDER — ROSUVASTATIN CALCIUM 20 MG/1
20 TABLET, COATED ORAL NIGHTLY
Status: DISCONTINUED | OUTPATIENT
Start: 2022-01-12 | End: 2022-01-15 | Stop reason: HOSPADM

## 2022-01-12 RX ORDER — HYDRALAZINE HYDROCHLORIDE 20 MG/ML
20 INJECTION INTRAMUSCULAR; INTRAVENOUS EVERY 6 HOURS PRN
Status: DISCONTINUED | OUTPATIENT
Start: 2022-01-12 | End: 2022-01-12

## 2022-01-12 RX ORDER — CHLORTHALIDONE 25 MG/1
12.5 TABLET ORAL DAILY
COMMUNITY

## 2022-01-12 RX ORDER — QUETIAPINE FUMARATE 25 MG/1
50 TABLET, FILM COATED ORAL 2 TIMES DAILY
Status: DISCONTINUED | OUTPATIENT
Start: 2022-01-12 | End: 2022-01-15 | Stop reason: HOSPADM

## 2022-01-12 RX ORDER — SODIUM CHLORIDE 9 MG/ML
50 INJECTION, SOLUTION INTRAVENOUS CONTINUOUS
Status: DISCONTINUED | OUTPATIENT
Start: 2022-01-12 | End: 2022-01-12

## 2022-01-12 RX ORDER — DONEPEZIL HYDROCHLORIDE 10 MG/1
10 TABLET, FILM COATED ORAL NIGHTLY
Status: DISCONTINUED | OUTPATIENT
Start: 2022-01-12 | End: 2022-01-15 | Stop reason: HOSPADM

## 2022-01-12 RX ORDER — ASPIRIN 81 MG/1
324 TABLET, CHEWABLE ORAL DAILY
Status: DISCONTINUED | OUTPATIENT
Start: 2022-01-12 | End: 2022-01-15 | Stop reason: HOSPADM

## 2022-01-12 RX ORDER — RISPERIDONE 0.5 MG/1
0.5 TABLET ORAL EVERY 12 HOURS SCHEDULED
Status: DISCONTINUED | OUTPATIENT
Start: 2022-01-12 | End: 2022-01-15 | Stop reason: HOSPADM

## 2022-01-12 RX ORDER — LORAZEPAM 0.5 MG/1
0.5 TABLET ORAL 2 TIMES DAILY
COMMUNITY
End: 2022-01-14 | Stop reason: HOSPADM

## 2022-01-12 RX ORDER — SENNA PLUS 8.6 MG/1
1 TABLET ORAL 2 TIMES DAILY
COMMUNITY

## 2022-01-12 RX ADMIN — HEPARIN SODIUM 5000 UNITS: 5000 INJECTION, SOLUTION INTRAVENOUS; SUBCUTANEOUS at 10:34

## 2022-01-12 RX ADMIN — SODIUM CHLORIDE, PRESERVATIVE FREE 10 ML: 5 INJECTION INTRAVENOUS at 09:26

## 2022-01-12 RX ADMIN — RISPERIDONE 0.5 MG: 0.5 TABLET ORAL at 21:48

## 2022-01-12 RX ADMIN — SODIUM CHLORIDE 50 ML/HR: 9 INJECTION, SOLUTION INTRAVENOUS at 06:40

## 2022-01-12 RX ADMIN — ROSUVASTATIN CALCIUM 20 MG: 20 TABLET, FILM COATED ORAL at 21:49

## 2022-01-12 RX ADMIN — LABETALOL HYDROCHLORIDE 20 MG: 5 INJECTION, SOLUTION INTRAVENOUS at 11:26

## 2022-01-12 RX ADMIN — SODIUM CHLORIDE, PRESERVATIVE FREE 10 ML: 5 INJECTION INTRAVENOUS at 21:50

## 2022-01-12 RX ADMIN — DONEPEZIL HYDROCHLORIDE 10 MG: 10 TABLET, FILM COATED ORAL at 21:49

## 2022-01-12 RX ADMIN — SENNOSIDES 1 TABLET: 8.6 TABLET, FILM COATED ORAL at 21:49

## 2022-01-12 RX ADMIN — SODIUM CHLORIDE 125 ML/HR: 9 INJECTION, SOLUTION INTRAVENOUS at 02:29

## 2022-01-12 RX ADMIN — SODIUM CHLORIDE 100 ML/HR: 4.5 INJECTION, SOLUTION INTRAVENOUS at 10:06

## 2022-01-12 RX ADMIN — SODIUM CHLORIDE 100 ML/HR: 4.5 INJECTION, SOLUTION INTRAVENOUS at 21:18

## 2022-01-12 RX ADMIN — QUETIAPINE FUMARATE 50 MG: 25 TABLET, FILM COATED ORAL at 21:48

## 2022-01-12 RX ADMIN — HEPARIN SODIUM 5000 UNITS: 5000 INJECTION, SOLUTION INTRAVENOUS; SUBCUTANEOUS at 21:47

## 2022-01-12 RX ADMIN — SODIUM CHLORIDE 500 ML: 9 INJECTION, SOLUTION INTRAVENOUS at 02:28

## 2022-01-12 NOTE — ED PROVIDER NOTES
Subjective   86 years old male with history of Alzheimer dementia, baseline confusion, hypertension, hyperlipidemia, [facility] in [facility] ER with decreased oral intake for the last couple of days, not acting at his baseline and was found to have oxygen saturation  in 70s, given DuoNeb and currently satting around 98% on room air.  Patient does not seem to be in any distress.  No fever reported.  Rapid COVID done at nursing home is negative.  History is obtained from nursing home record and EMS as patient has confusion at baseline.      History provided by:  Patient  History limited by:  Dementia      Review of Systems   Unable to perform ROS: Dementia       Past Medical History:   Diagnosis Date   • Alzheimer's dementia (CMS/HCC)    • Hyperlipidemia    • Hypertension        Allergies   Allergen Reactions   • Codeine Other (See Comments)     Unknown reaction; patient's wife stated that patient is allergic to medication.    • Namenda [Memantine] Other (See Comments)     Unknown reaciton       Past Surgical History:   Procedure Laterality Date   • KNEE SURGERY Left        No family history on file.    Social History     Socioeconomic History   • Marital status:    Tobacco Use   • Smoking status: Never Smoker   • Smokeless tobacco: Never Used   Substance and Sexual Activity   • Alcohol use: No   • Drug use: No   • Sexual activity: Defer           Objective   Physical Exam  Vitals and nursing note reviewed.   Constitutional:       Appearance: He is well-developed.   HENT:      Head: Normocephalic.      Mouth/Throat:      Mouth: Mucous membranes are moist.   Eyes:      Extraocular Movements: Extraocular movements intact.   Cardiovascular:      Rate and Rhythm: Normal rate and regular rhythm.   Pulmonary:      Effort: Pulmonary effort is normal.      Breath sounds: Normal breath sounds.   Abdominal:      General: Bowel sounds are normal.      Palpations: Abdomen is soft.   Musculoskeletal:          General: Normal range of motion.      Cervical back: Normal range of motion and neck supple.   Skin:     General: Skin is warm.      Capillary Refill: Capillary refill takes 2 to 3 seconds.   Neurological:      Mental Status: He is alert. He is disoriented.      Cranial Nerves: No cranial nerve deficit or facial asymmetry.      Deep Tendon Reflexes: Reflexes normal.   Psychiatric:         Cognition and Memory: Memory is impaired.         ECG 12 Lead      Date/Time: 1/12/2022 2:26 AM  Performed by: Santosh Pinzon MD  Authorized by: Santosh Pinzon MD   Interpreted by physician  Rhythm: sinus rhythm  Rate: normal  BPM: 69  QRS axis: normal  Clinical impression: abnormal ECG  Comments: Nonspecific ST and T wave changes. No ST elevation                 ED Course                                                 MDM  Number of Diagnoses or Management Options  Acute renal failure, unspecified acute renal failure type (HCC)  Altered mental status, unspecified altered mental status type  Diagnosis management comments: Patient is not hypoxic on presentation.  Maintaining oxygen saturation around 98% on room air.  X-ray chest negative for any acute findings.  Patient has acute on chronic renal failure with uremia which could be the reason for his worsening confusion and also decreased oral intake is causing it.  Also has mildly elevated troponin which I believe is secondary to renal cause.  Given fluids.  Discussed with Dr. Diaz and patient is admitted.       Amount and/or Complexity of Data Reviewed  Clinical lab tests: ordered and reviewed  Tests in the radiology section of CPT®: ordered and reviewed  Discuss the patient with other providers: yes      Labs Reviewed   COMPREHENSIVE METABOLIC PANEL - Abnormal; Notable for the following components:       Result Value    Glucose 122 (*)     BUN 62 (*)     Creatinine 3.21 (*)     Sodium 147 (*)     Chloride 108 (*)     eGFR Non  Amer 18 (*)     All other components  within normal limits    Narrative:     GFR Normal >60  Chronic Kidney Disease <60  Kidney Failure <15     TROPONIN (IN-HOUSE) - Abnormal; Notable for the following components:    Troponin T 0.040 (*)     All other components within normal limits    Narrative:     Troponin T Reference Range:  <= 0.03 ng/mL-   Negative for AMI  >0.03 ng/mL-     Abnormal for myocardial necrosis.  Clinicians would have to utilize clinical acumen, EKG, Troponin and serial changes to determine if it is an Acute Myocardial Infarction or myocardial injury due to an underlying chronic condition.       Results may be falsely decreased if patient taking Biotin.     MAGNESIUM - Abnormal; Notable for the following components:    Magnesium 2.7 (*)     All other components within normal limits   CBC WITH AUTO DIFFERENTIAL - Abnormal; Notable for the following components:    RBC 3.46 (*)     Hemoglobin 10.6 (*)     Hematocrit 33.3 (*)     Lymphocyte % 17.1 (*)     Monocytes, Absolute 1.01 (*)     All other components within normal limits   TSH - Abnormal; Notable for the following components:    TSH 4.710 (*)     All other components within normal limits   COVID-19 AND FLU A/B, NP SWAB IN TRANSPORT MEDIA 8-12 HR TAT - Normal    Narrative:     Fact sheet for providers: https://www.fda.gov/media/477722/download    Fact sheet for patients: https://www.fda.gov/media/056507/download    Test performed by PCR.   PROCALCITONIN - Normal    Narrative:     As a Marker for Sepsis (Non-Neonates):     1. <0.5 ng/mL represents a low risk of severe sepsis and/or septic shock.  2. >2 ng/mL represents a high risk of severe sepsis and/or septic shock.    As a Marker for Lower Respiratory Tract Infections that require antibiotic therapy:  PCT on Admission     Antibiotic Therapy             6-12 Hrs later  >0.5                          Strongly Recommended            >0.25 - <0.5             Recommended  0.1 - 0.25                  Discouraged                        "Remeasure/reassess PCT  <0.1                         Strongly Discouraged         Remeasure/reassess PCT      As 28 day mortality risk marker: \"Change in Procalcitonin Result\" (>80% or <=80%) if Day 0 (or Day 1) and Day 4 values are available. Refer to http://www.[employer]sa[facility].compct-calculator.com/    Change in PCT <=80 %   A decrease of PCT levels below or equal to 80% defines a positive change in PCT test result representing a higher risk for 28-day all-cause mortality of patients diagnosed with severe sepsis or septic shock.    Change in PCT >80 %   A decrease of PCT levels of more than 80% defines a negative change in PCT result representing a lower risk for 28-day all-cause mortality of patients diagnosed with severe sepsis or septic shock.               LACTIC ACID, PLASMA - Normal   BNP (IN-HOUSE) - Normal    Narrative:     Among patients with dyspnea, NT-proBNP is highly sensitive for the detection of acute congestive heart failure. In addition NT-proBNP of <300 pg/ml effectively rules out acute congestive heart failure with 99% negative predictive value.    Results may be falsely decreased if patient taking Biotin.     BLOOD CULTURE   BLOOD CULTURE   TROPONIN (IN-HOUSE)   URINALYSIS W/ CULTURE IF INDICATED   HEMOGLOBIN A1C   URINALYSIS, MICROSCOPIC ONLY   CBC AND DIFFERENTIAL    Narrative:     The following orders were created for panel order CBC & Differential.  Procedure                               Abnormality         Status                     ---------                               -----------         ------                     CBC Auto Differential[[id number]]        Abnormal            Final result                 Please view results for these tests on the individual orders.       XR Chest AP    Result Date: 1/12/2022  Narrative: EXAM:   XR Chest, 1 View CLINICAL HISTORY:   The patient is 86 years old and is Male; COVID Evaluation, Cough, Fever TECHNIQUE:   Frontal view of the chest. COMPARISON:   XR CHEST " dated June 25 2021 FINDINGS:   LUNGS:  There are slightly low lung volumes. No consolidation.   PLEURAL SPACE:  Unremarkable.  No pneumothorax.   HEART:  Unremarkable.  No cardiomegaly.   MEDIASTINUM:  Unremarkable.   BONES/JOINTS:  There are degenerative changes of the spine.   VASCULATURE:  Atherosclerosis of the aorta is present.   UPPER ABDOMEN:  Elevation of the right hemidiaphragm is present.     Impression:   No acute cardiopulmonary process. Electronically signed by:  Lenka Santos MD  1/12/2022 1:24 AM CST Workstation: [id number]        Final diagnoses:   Acute renal failure, unspecified acute renal failure type (HCC)   Altered mental status, unspecified altered mental status type       ED Disposition  ED Disposition     ED Disposition Condition Comment    Decision to Admit  Level of Care: Telemetry [5]   Diagnosis: Acute renal failure, unspecified acute renal failure type (HCC) [[id number]]   Admitting Physician: JEANETTE MCCONNELL [[id number]]   Attending Physician: JEANETTE MCCONNELL [[id number]]            No follow-up provider specified.       Medication List      No changes were made to your prescriptions during this visit.          Santosh Pinzon MD  01/12/22 9877

## 2022-01-12 NOTE — ED NOTES
Spoke with Dr. Lopez about patient, he is not caring for patient and will talk to who has him.      Yuridia Frias, RN  01/12/22 8969

## 2022-01-12 NOTE — THERAPY EVALUATION
Acute Care - Speech Language Pathology   Swallow Initial Evaluation [facility]     Patient Name: George Puga Jr.  : 1935  MRN: [id number]  Today's Date: 2022               Admit Date: 2022     Pt seen for skilled ST services this date to assess swallow function 2/2 AMS and PMH of dementia.  Pt was non-communicative, but did accept bolus from SLP when presented.  Pt consumed ice chips and thin liquids via spoon and straw w/no overt s/s of aspiration.  Pt consumed puree w/poor bolus manipulation and oral residue.  Pt also demonstrates difficulty accepting bolus initially d/t cognition.  Pt accepts bolus more easily when bolus is presented slowly and tactile cue provided to prepare pt for task of accpeting PO solids/liquids.  SLP also provided verbal cues to assist w/prepping for bolus acceptance.  SLP recommends advancing diet to puree/thin w/feeding assist, present food/liquids slowly, and cyclic eating to improve PO intake and safety.  SLP to f/u w/pt for 1-2 sessions for diet safety and toleration.  SLP unable to educate pt d/t cognitive status.  SLP updated informational board w/diet and recommended strategies for safe feeding.    Goal:  Patient will safely tolerate least restricted diet w/no overt s/s of aspiration for adequate nutrition and hydration:      Visit Dx:     ICD-10-CM ICD-9-CM   1. Acute renal failure, unspecified acute renal failure type (HCC)  N17.9 584.9   2. Altered mental status, unspecified altered mental status type  R41.82 780.97   3. Oral phase dysphagia  R13.11 787.21     Patient Active Problem List   Diagnosis   • Elevated lactic acid level   • Generalized weakness   • Alzheimer's dementia without behavioral disturbance (HCC)   • LUZ (acute kidney injury) (HCC)   • Acute renal failure (HCC)     Past Medical History:   Diagnosis Date   • Alzheimer's dementia (HCC)    • Hyperlipidemia    • Hypertension      Past Surgical History:   Procedure Laterality Date    • KNEE SURGERY Left        SLP Recommendation and Plan  SLP Swallowing Diagnosis: mild-moderate, oral dysphagia (01/12/22 1300)  SLP Diet Recommendation: puree, thin liquids (01/12/22 1300)  Recommended Precautions and Strategies: upright posture during/after eating, small bites of food and sips of liquid, alternate between small bites of food and sips of liquid, general aspiration precautions, fatigue precautions, assist with feeding (01/12/22 1300)  SLP Rec. for Method of Medication Administration: meds crushed, with pudding or applesauce (01/12/22 1300)     Monitor for Signs of Aspiration: yes, notify SLP if any concerns (01/12/22 1300)     Swallow Criteria for Skilled Therapeutic Interventions Met: demonstrates skilled criteria (01/12/22 1300)  Anticipated Discharge Disposition (SLP): skilled nursing facility (01/12/22 1300)  Rehab Potential/Prognosis, Swallowing: adequate, monitor progress closely (01/12/22 1300)  Therapy Frequency (Swallow): 1 day per week, 2 days per week (01/12/22 1300)  Predicted Duration Therapy Intervention (Days): 1 day, 3 days (01/12/22 1300)                     Treatment Assessment (SLP): oral dysphagia (01/12/22 1300)            Outcome Summary: Pt seen for skilled ST services this date to assess swallow function 2/2 AMS and PMH of dementia.  Pt was non-communicative, but did accept bolus from SLP when presented.  Pt consumed ice chips and thin liquids via spoon and straw w/no overt s/s of aspiration.  Pt consumed puree w/poor bolus manipulation and oral residue.  Pt also demonstrates difficulty accepting bolus initially d/t cognition.  Pt accepts bolus more easily when bolus is presented slowly and tactile cue provided to prepare pt for task of accpeting PO solids/liquids.  SLP also provided verbal cues to assist w/prepping for bolus acceptance.  SLP recommends advancing diet to puree/thin w/feeding assist, present food/liquids slowly, and cyclic eating to improve PO intake and  safety.  SLP to f/u w/pt for 1-2 sessions for diet safety and toleration.  SLP unable to educate pt d/t cognitive status.  SLP updated informational board w/diet and recommended strategies for safe feeding.      SWALLOW EVALUATION (last 72 hours)     SLP Adult Swallow Evaluation     Row Name 01/12/22 1300                   Rehab Evaluation    Document Type evaluation  -CK        Total Evaluation Minutes, SLP 24  -CK        Subjective Information no complaints  did not communicate  -CK        Patient Observations alert; cooperative  -CK        Patient/Family/Caregiver Comments/Observations No family present at bedside  -CK        Patient Effort adequate  -CK                  General Information    Patient Profile Reviewed yes  -CK        Pertinent History Of Current Problem Pt admitted w/AMS; has advanced dementia; decline in PO intake the past two days; able to feed self prior to AMS  -CK        Current Method of Nutrition NPO  -CK        Prior Level of Function-Communication cognitive-linguistic impairment  dementia  -CK        Prior Level of Function-Swallowing unknown  -CK        Barriers to Rehab cognitive status  -CK        Patient's Goals for Discharge patient could not state  -CK                  Pain    Additional Documentation Pain Scale: FACES Pre/Post-Treatment (Group)  -CK                  Pain Scale: FACES Pre/Post-Treatment    Pain: FACES Scale, Pretreatment 0-->no hurt  -CK        Posttreatment Pain Rating 0-->no hurt  -CK                  Oral Motor Structure and Function    Dentition Assessment natural, present and adequate  -CK        Secretion Management WNL/WFL  -CK        Mucosal Quality cracked; dry  -CK        Volitional Swallow unable to elicit  -CK        Volitional Cough unable to elicit  -CK                  General Eating/Swallowing Observations    Respiratory Support Currently in Use room air  -CK        Eating/Swallowing Skills fed by SLP  -CK        Positioning During Eating upright 90  degree; upright in bed  -CK        Utensils Used spoon; straw  -CK        Consistencies Trialed pureed; ice chips; thin liquids  -CK                  Clinical Swallow Eval    Oral Prep Phase impaired  -CK        Oral Transit WFL  -CK        Oral Residue impaired  -CK        Pharyngeal Phase no overt signs/symptoms of pharyngeal impairment  -CK        Esophageal Phase unremarkable  -CK        Clinical Swallow Evaluation Summary Pt seen for skilled ST services this date to assess swallow function 2/2 AMS and PMH of dementia.  Pt was non-communicative, but did accept bolus from SLP when presented.  Pt consumed ice chips and thin liquids via spoon and straw w/no overt s/s of aspiration.  Pt consumed puree w/poor bolus manipulation and oral residue.  Pt also demonstrates difficulty accepting bolus initially d/t cognition.  Pt accepts bolus more easily when bolus is presented slowly and tactile cue provided to prepare pt for task of accpeting PO solids/liquids.  SLP also provided verbal cues to assist w/prepping for bolus acceptance.  SLP recommends advancing diet to puree/thin w/feeding assist, present food/liquids slowly, and cyclic eating to improve PO intake and safety.  SLP to f/u w/pt for 1-2 sessions for diet safety and toleration.  SLP unable to educate pt d/t cognitive status.  SLP updated informational board w/diet and recommended strategies for safe feeding.  -CK                  Oral Prep Concerns    Oral Prep Concerns incomplete bolus preparation  -CK        Incomplete Bolus Preparation pudding  -CK                  Oral Residue Concerns    Oral Residue Concerns diffuse residue throughout oral cavity  -CK        Diffuse Residue Throughout Oral Cavity pudding  -CK                  SLP Evaluation Clinical Impression    SLP Swallowing Diagnosis mild-moderate; oral dysphagia  -CK        Functional Impact risk of aspiration/pneumonia; risk of malnutrition; risk of dehydration  -CK        Rehab Potential/Prognosis,  Swallowing adequate, monitor progress closely  -CK        Swallow Criteria for Skilled Therapeutic Interventions Met demonstrates skilled criteria  -CK                  SLP Treatment Clinical Impressions    Treatment Assessment (SLP) oral dysphagia  -CK        Barriers to Overall Progress (SLP) Cognitive status; Baseline deficits  -CK        Care Plan Review --  unable to educate d/t cognition  -CK                  Recommendations    Therapy Frequency (Swallow) 1 day per week; 2 days per week  -CK        Predicted Duration Therapy Intervention (Days) 1 day; 3 days  -CK        SLP Diet Recommendation puree; thin liquids  -CK        Recommended Precautions and Strategies upright posture during/after eating; small bites of food and sips of liquid; alternate between small bites of food and sips of liquid; general aspiration precautions; fatigue precautions; assist with feeding  -CK        Oral Care Recommendations Oral Care before breakfast, after meals and PRN  -CK        SLP Rec. for Method of Medication Administration meds crushed; with pudding or applesauce  -CK        Monitor for Signs of Aspiration yes; notify SLP if any concerns  -CK        Anticipated Discharge Disposition (SLP) skilled nursing facility  -CK              User Key  (r) = Recorded By, (t) = Taken By, (c) = Cosigned By    Initials Name Effective Dates    CK Estrella Jane, MS CCC-SLP 06/16/21 -                 EDUCATION  The patient has been educated in the following areas:   Unable to edcuate pt d/t cognition.              Time Calculation:    Time Calculation- SLP     Row Name 01/12/22 1324             Time Calculation- SLP    SLP Start Time 1300  -CK      SLP Stop Time 1324  -CK      SLP Time Calculation (min) 24 min  -CK      Total Timed Code Minutes- SLP 24 minute(s)  -CK      SLP Received On 01/12/22  -CK      SLP Goal Re-Cert Due Date 01/26/22  -CK              Untimed Charges    SLP Eval/Re-eval  ST Eval Oral Pharyng Swallow - [id number]   -CK      [id number] Eval Oral Pharyng Swallow Minutes 24  -CK              Total Minutes    Untimed Charges Total Minutes 24  -CK       Total Minutes 24  -CK            User Key  (r) = Recorded By, (t) = Taken By, (c) = Cosigned By    Initials Name Provider Type    Estrella Pettit MS CCC-SLP Speech and Language Pathologist                Therapy Charges for Today     Code Description Service Date Service Provider Modifiers Qty    [id number]  ST EVAL ORAL PHARYNG SWALLOW 2 1/12/2022 Estrella Jane, MS CCC-SLP GN 1               Estrella Jane MS CCC-SLP  1/12/2022

## 2022-01-12 NOTE — ED NOTES
[facility] spoke [facility] about patients daily living.  Vaishnavi states that he normally feeds himself but has not the last two days,  Vaishnavi says that he has been more altered than his normal last couple of days, he is very high risk for falls but he is able to walk, medications need to be crushed and put in applesauce to take.      Yuridia Frias RN  01/12/22 3519

## 2022-01-12 NOTE — ED NOTES
Per Vaishnavi at [facility], pt has been lethargic all day. O2 sats 72% on room air. O2 applied. Rapid covid negative. Baseline confused, ambulatory. Can be combative at times but not today. Poor PO intake x 2-3 days. DNR status.     Katheryn Bernard, RN  01/11/22 5535

## 2022-01-12 NOTE — PLAN OF CARE
Problem: Adult Inpatient Plan of Care  Goal: Plan of Care Review  Flowsheets (Taken 1/12/2022 1323)  Outcome Summary: Pt seen for skilled ST services this date to assess swallow function 2/2 AMS and PMH of dementia.  Pt was non-communicative, but did accept bolus from SLP when presented.  Pt consumed ice chips and thin liquids via spoon and straw w/no overt s/s of aspiration.  Pt consumed puree w/poor bolus manipulation and oral residue.  Pt also demonstrates difficulty accepting bolus initially d/t cognition.  Pt accepts bolus more easily when bolus is presented slowly and tactile cue provided to prepare pt for task of accpeting PO solids/liquids.  SLP also provided verbal cues to assist w/prepping for bolus acceptance.  SLP recommends advancing diet to puree/thin w/feeding assist, present food/liquids slowly, and cyclic eating to improve PO intake and safety.  SLP to f/u w/pt for 1-2 sessions for diet safety and toleration.  SLP unable to educate pt d/t cognitive status.  SLP updated informational board w/diet and recommended strategies for safe feeding.   Goal Outcome Evaluation:

## 2022-01-12 NOTE — NURSING NOTE
[facility] with [facility] to discuss pt admission questions and verify pt PTA med list.

## 2022-01-12 NOTE — ED NOTES
Patient is at an increased risk for falls. Fall light activated, yellow falls bracelet and yellow non-skid socks placed on patient. Call light within reach and patient instructed to call for assistance. Side rails up x2, bed alarm activated, and gait belt readily accessible.        Michael Rose, RN  01/12/22 3926

## 2022-01-12 NOTE — PROGRESS NOTES
[facility] Medicine Services  INPATIENT PROGRESS NOTE    Length of Stay: 0  Date of Admission: 1/12/2022  Primary Care Physician: Anthony Mack MD    Subjective   Chief Complaint: AMS  HPI:  86 year old male with a history of dementia who presents with poor PO intake and worsening confusion.  He was found to be hypoxic at the SNF, which improved with bronchodilators. He remains confused.  CT head is without acute findings.  Creatinine is elevated from baseline.     Review of Systems   Unable to perform ROS: Dementia        All pertinent negatives and positives are as above. All other systems have been reviewed and are negative unless otherwise stated.     Objective    Temp:  [96.8 °F (36 °C)-98.5 °F (36.9 °C)] 96.8 °F (36 °C)  Heart Rate:  [63-86] 63  Resp:  [18-20] 18  BP: (127-208)/() 152/74    Physical Exam  Vitals reviewed.   Constitutional:       Appearance: Normal appearance.      Comments: Confused, picking at bedsheets   HENT:      Head: Normocephalic and atraumatic.   Cardiovascular:      Rate and Rhythm: Normal rate and regular rhythm.   Pulmonary:      Effort: Pulmonary effort is normal. No respiratory distress.      Breath sounds: Normal breath sounds.   Abdominal:      General: There is no distension.      Palpations: Abdomen is soft.      Tenderness: There is no abdominal tenderness.   Musculoskeletal:         General: No swelling or deformity.   Skin:     General: Skin is warm and dry.   Neurological:      Mental Status: He is alert.      Comments: Confused, Advanced dementia, non-verbal         Results Review:  I have reviewed the labs, radiology results, and diagnostic studies.    Laboratory Data:   Results from last 7 days   Lab Units 01/12/22  0626 01/12/22  0111   SODIUM mmol/L 146* 147*   POTASSIUM mmol/L 4.5 4.6   CHLORIDE mmol/L 109* 108*   CO2 mmol/L 25.0 27.0   BUN mg/dL 57* 62*   CREATININE mg/dL 2.69* 3.21*   GLUCOSE mg/dL 109*  122*   CALCIUM mg/dL 8.9 9.2   BILIRUBIN mg/dL  --  0.3   ALK PHOS U/L  --  105   ALT (SGPT) U/L  --  16   AST (SGOT) U/L  --  32   ANION GAP mmol/L 12.0 12.0     Estimated Creatinine Clearance: 19.5 mL/min (A) (by C-G formula based on SCr of 2.69 mg/dL (H)).  Results from last 7 days   Lab Units 01/12/22  0111   MAGNESIUM mg/dL 2.7*         Results from last 7 days   Lab Units 01/12/22  0626 01/12/22  0111   WBC 10*3/mm3 9.53 9.06   HEMOGLOBIN g/dL 10.6* 10.6*   HEMATOCRIT % 33.0* 33.3*   PLATELETS 10*3/mm3 233 245           Culture Data:   No results found for: BLOODCX  No results found for: URINECX  No results found for: RESPCX  No results found for: WOUNDCX  No results found for: STOOLCX  No components found for: BODYFLD    Radiology Data:   Imaging Results (Last 24 Hours)     Procedure Component Value Units Date/Time    CT Head Without Contrast [[id number]] Collected: 01/12/22 0929     Updated: 01/12/22 1001    Narrative:      CT head without IV contrast January 12, 2022    INDICATION: Acute onset confusion    TECHNIQUE: Spiral images obtained from foramen magnum through  vertex without IV contrast. Sagittal, axial and coronal  reformatted images generated retrospectively.    FINDINGS:  Postoperative changes right parieto-occipital region craniotomy  overlying focal encephalomalacia. Appearance stable compared with  March 13, 2019.  Atrophy with evidence of chronic small vessel white matter  ischemic change.  Asymmetric left cerebellar atrophy compared to the right.  No definite acute parenchymal pathology appreciated.  Mild probable chronic bilateral ethmoid sinus inflammation.  Mastoids clear.  No acute bony abnormality.      Impression:      Atrophy with evidence of chronic small vessel white matter  ischemic change.  Postoperative  right parieto-occipital region.  No definite acute intracranial pathology.  Probable chronic bilateral ethmoid sinus inflammation.    Electronically signed by:  Mukund Thrasher MD   1/12/2022 9:59 AM  CST Workstation: [id number]    XR Chest AP [[id number]] Collected: 01/12/22 0101     Updated: 01/12/22 0125    Narrative:      EXAM:    XR Chest, 1 View    CLINICAL HISTORY:    The patient is 86 years old and is Male; COVID Evaluation,  Cough, Fever    TECHNIQUE:    Frontal view of the chest.    COMPARISON:    XR CHEST dated June 25 2021    FINDINGS:    LUNGS:  There are slightly low lung volumes. No consolidation.    PLEURAL SPACE:  Unremarkable.  No pneumothorax.    HEART:  Unremarkable.  No cardiomegaly.    MEDIASTINUM:  Unremarkable.    BONES/JOINTS:  There are degenerative changes of the spine.    VASCULATURE:  Atherosclerosis of the aorta is present.    UPPER ABDOMEN:  Elevation of the right hemidiaphragm is  present.      Impression:        No acute cardiopulmonary process.    Electronically signed by:  Lenka Santos MD  1/12/2022 1:24 AM  [facility] Workstation: [id number]          I have reviewed the patient's current medications.     Assessment/Plan     Active Hospital Problems    Diagnosis    • Acute renal failure (HCC)    AMS, metabolic encephalopathy  Hypernatremia  Advanced Alzheimer's dementia  HTN  Abnormal troponin      Plan:    IV fluid: 1/2 NS @ 100 ml/hr  Follow renal function  SLP  Hold nephrotoxins  Trace elevation of troponin, likely secondary to LUZ, repeat has decreased  BP control: chlorthalidone discontinued, Add Norvasc, PRN labetalol  Continue chronic medications  CT head negative, if does not return to baseline with correction of metabolic derangements, may need to consider MRI.   VTE PPx: heparin      I confirmed that the patient's Advance Care Plan is present, code status is documented, or surrogate decision maker is listed in the patient's medical record.     The patient was evaluated during the global COVID-19 pandemic, and the diagnosis was suspected/considered upon their initial presentation.  Evaluation, treatment, and testing were consistent with current guidelines  for patients who present with complaints or symptoms that may be related to COVID-19.          This document has been electronically signed by NICHOLAS Lawrence on January 12, 2022 13:43 CST

## 2022-01-12 NOTE — SIGNIFICANT NOTE
01/12/22 0933   Rehab Time/Intention   Session Not Performed patient unavailable for evaluation  (Pt not in room when SLP attempted eval; will check back)

## 2022-01-12 NOTE — H&P
[facility] Medicine Services  INPATIENT HISTORY AND PHYSICAL       Patient Care Team:  Anthony Mack MD as PCP - General (Family Medicine)    Date of Admission: 1/12/2022    Chief complaint   Chief Complaint   Patient presents with   • Altered Mental Status       Subjective     Patient is a 86 y.o. male past medical history of hypertension, dyslipidemia and Alzheimer's dementia who resides at the  nursing home was brought in with a history of decreased oral intake for the past couple of days and him being below his baseline mental status, he was found to be hypoxic saturating 70% for which EMS was called, in route to the hospital he received DuoNeb nebulizers with improvement in saturation upon arriving to the emergency room his oxygen saturations were above 90%.  Medical admission and observation is requested after he was noted to have worsening renal function.    Review of Systems   Unable to perform ROS: Dementia         History  Past Medical History:   Diagnosis Date   • Alzheimer's dementia (CMS/[facility])    • Hyperlipidemia    • Hypertension      Past Surgical History:   Procedure Laterality Date   • KNEE SURGERY Left      No family history on file.  Social History     Tobacco Use   • Smoking status: Never Smoker   • Smokeless tobacco: Never Used   Substance Use Topics   • Alcohol use: No   • Drug use: No     Allergies:  Codeine and Namenda [memantine]  Prior to Admission medications    Medication Sig Start Date End Date Taking? Authorizing Provider   chlorthalidone (HYGROTON) 12.5 MG half tablet Take 12.5 mg by mouth Daily.   Yes Rema Cordero MD   LORazepam (ATIVAN) 0.5 MG tablet Take 0.5 mg by mouth 2 (Two) Times a Day.   Yes Rema Cordero MD   polyethylene glycol (MiraLax) 17 GM/SCOOP powder Take 17 g by mouth Daily.   Yes Rema Cordero MD   senna (senna) 8.6 MG tablet Take 1 tablet by mouth 2 (Two) Times a Day.   Yes Rema Cordero  MD   vitamin D (ERGOCALCIFEROL) 1.25 MG (69173 UT) capsule capsule Take 50,000 Units by mouth 1 (One) Time Per Week.   Yes Rema Cordero MD   aspirin 81 MG chewable tablet Chew 81 mg Daily.    Rema Cordero MD   donepezil (ARICEPT) 10 MG tablet Take 10 mg by mouth.    Rema Cordero MD   levothyroxine (SYNTHROID, LEVOTHROID) 150 MCG tablet Take 150 mcg by mouth Daily.    Rema Cordero MD   memantine (NAMENDA) 10 MG tablet Take 10 mg by mouth.    Rema Cordero MD   QUEtiapine (SEROquel) 25 MG tablet Take 50 mg by mouth 2 (Two) Times a Day. Pt takes one tablet at supper and one tablet an hour before bedtime.     Rema Cordero MD   risperiDONE (risperDAL) 0.5 MG tablet Take 0.5 mg by mouth 2 (Two) Times a Day.    Rema Cordero MD   rosuvastatin (CRESTOR) 20 MG tablet Take 20 mg by mouth.    Rema Cordero MD   sertraline (ZOLOFT) 100 MG tablet Take 100 mg by mouth Daily.    Rema Cordero MD   verapamil PM (VERELAN PM) 180 MG 24 hr capsule Take 120 mg by mouth.    Rema Cordero MD       Objective        Vital Signs  Heart Rate:  [71-76] 71  Resp:  [18] 18  BP: (136-188)/(61-78) 136/61      Physical Exam  Constitutional:       General: He is not in acute distress.     Appearance: He is not diaphoretic.   HENT:      Head: Normocephalic and atraumatic.      Right Ear: External ear normal.      Left Ear: External ear normal.      Mouth/Throat:      Mouth: Mucous membranes are dry.   Eyes:      Conjunctiva/sclera: Conjunctivae normal.   Cardiovascular:      Rate and Rhythm: Normal rate and regular rhythm.      Pulses: Normal pulses.      Heart sounds: Normal heart sounds.   Pulmonary:      Effort: Pulmonary effort is normal.      Breath sounds: Normal breath sounds.   Abdominal:      General: Bowel sounds are normal.      Palpations: Abdomen is soft.   Musculoskeletal:      Cervical back: Neck supple.      Right lower leg: No edema.      Left lower  "leg: No edema.   Skin:     General: Skin is warm.   Neurological:      Mental Status: He is alert. He is disoriented.           Results Review:   Lab Results (last 24 hours)     Procedure Component Value Units Date/Time    Procalcitonin [[id number]]  (Normal) Collected: 01/12/22 0111    Specimen: Blood Updated: 01/12/22 0301     Procalcitonin 0.15 ng/mL     Narrative:      As a Marker for Sepsis (Non-Neonates):     1. <0.5 ng/mL represents a low risk of severe sepsis and/or septic shock.  2. >2 ng/mL represents a high risk of severe sepsis and/or septic shock.    As a Marker for Lower Respiratory Tract Infections that require antibiotic therapy:  PCT on Admission     Antibiotic Therapy             6-12 Hrs later  >0.5                          Strongly Recommended            >0.25 - <0.5             Recommended  0.1 - 0.25                  Discouraged                       Remeasure/reassess PCT  <0.1                         Strongly Discouraged         Remeasure/reassess PCT      As 28 day mortality risk marker: \"Change in Procalcitonin Result\" (>80% or <=80%) if Day 0 (or Day 1) and Day 4 values are available. Refer to http://www.[place]-pct-calculator.com/    Change in PCT <=80 %   A decrease of PCT levels below or equal to 80% defines a positive change in PCT test result representing a higher risk for 28-day all-cause mortality of patients diagnosed with severe sepsis or septic shock.    Change in PCT >80 %   A decrease of PCT levels of more than 80% defines a negative change in PCT result representing a lower risk for 28-day all-cause mortality of patients diagnosed with severe sepsis or septic shock.                Urinalysis With Culture If Indicated - Urine, Catheter [[id number]] Collected: 01/12/22 0242    Specimen: Urine, Catheter Updated: 01/12/22 0248    Blood Culture - Blood, Arm, Left [[id number]] Collected: 01/12/22 0233    Specimen: Blood from Arm, Left Updated: 01/12/22 0248    Troponin [[id number]]  " (Abnormal) Collected: 01/12/22 0111    Specimen: Blood Updated: 01/12/22 0213     Troponin T 0.040 ng/mL     Narrative:      Troponin T Reference Range:  <= 0.03 ng/mL-   Negative for AMI  >0.03 ng/mL-     Abnormal for myocardial necrosis.  Clinicians would have to utilize clinical acumen, EKG, Troponin and serial changes to determine if it is an Acute Myocardial Infarction or myocardial injury due to an underlying chronic condition.       Results may be falsely decreased if patient taking Biotin.      BNP [[id number]]  (Normal) Collected: 01/12/22 0111    Specimen: Blood Updated: 01/12/22 0152     proBNP 289.1 pg/mL     Narrative:      Among patients with dyspnea, NT-proBNP is highly sensitive for the detection of acute congestive heart failure. In addition NT-proBNP of <300 pg/ml effectively rules out acute congestive heart failure with 99% negative predictive value.    Results may be falsely decreased if patient taking Biotin.      TSH [[id number]]  (Abnormal) Collected: 01/12/22 0111    Specimen: Blood Updated: 01/12/22 0152     TSH 4.710 uIU/mL     Comprehensive Metabolic Panel [[id number]]  (Abnormal) Collected: 01/12/22 0111    Specimen: Blood Updated: 01/12/22 0147     Glucose 122 mg/dL      BUN 62 mg/dL      Creatinine 3.21 mg/dL      Sodium 147 mmol/L      Potassium 4.6 mmol/L      Chloride 108 mmol/L      CO2 27.0 mmol/L      Calcium 9.2 mg/dL      Total Protein 7.7 g/dL      Albumin 4.10 g/dL      ALT (SGPT) 16 U/L      AST (SGOT) 32 U/L      Alkaline Phosphatase 105 U/L      Total Bilirubin 0.3 mg/dL      eGFR Non African Amer 18 mL/min/1.73      Globulin 3.6 gm/dL      A/G Ratio 1.1 g/dL      BUN/Creatinine Ratio 19.3     Anion Gap 12.0 mmol/L     Narrative:      GFR Normal >60  Chronic Kidney Disease <60  Kidney Failure <15      Magnesium [[id number]]  (Abnormal) Collected: 01/12/22 0111    Specimen: Blood Updated: 01/12/22 0147     Magnesium 2.7 mg/dL     Lactic Acid, Plasma [[id number]]  (Normal)  Collected: 01/12/22 0111    Specimen: Blood Updated: 01/12/22 0143     Lactate 0.9 mmol/L     COVID-19 and FLU A/B PCR - Swab, Nasopharynx [[id number]]  (Normal) Collected: 01/12/22 0112    Specimen: Swab from Nasopharynx Updated: 01/12/22 0140     COVID19 Not Detected     Influenza A PCR Not Detected     Influenza B PCR Not Detected    Narrative:      Fact sheet for providers: https://www.fda.gov/media/013786/download    Fact sheet for patients: https://www.fda.gov/media/764774/download    Test performed by PCR.    CBC & Differential [[id number]]  (Abnormal) Collected: 01/12/22 0111    Specimen: Blood Updated: 01/12/22 0118    Narrative:      The following orders were created for panel order CBC & Differential.  Procedure                               Abnormality         Status                     ---------                               -----------         ------                     CBC Auto Differential[[id number]]        Abnormal            Final result                 Please view results for these tests on the individual orders.    CBC Auto Differential [[id number]]  (Abnormal) Collected: 01/12/22 0111    Specimen: Blood Updated: 01/12/22 0118     WBC 9.06 10*3/mm3      RBC 3.46 10*6/mm3      Hemoglobin 10.6 g/dL      Hematocrit 33.3 %      MCV 96.2 fL      MCH 30.6 pg      MCHC 31.8 g/dL      RDW 13.6 %      RDW-SD 48.1 fl      MPV 11.2 fL      Platelets 245 10*3/mm3      Neutrophil % 69.1 %      Lymphocyte % 17.1 %      Monocyte % 11.1 %      Eosinophil % 2.0 %      Basophil % 0.3 %      Immature Grans % 0.4 %      Neutrophils, Absolute 6.25 10*3/mm3      Lymphocytes, Absolute 1.55 10*3/mm3      Monocytes, Absolute 1.01 10*3/mm3      Eosinophils, Absolute 0.18 10*3/mm3      Basophils, Absolute 0.03 10*3/mm3      Immature Grans, Absolute 0.04 10*3/mm3      nRBC 0.0 /100 WBC     Blood Culture - Blood, Arm, Left [[id number]] Collected: 01/12/22 0112    Specimen: Blood from Arm, Left Updated: 01/12/22 0114          Imaging Results (Last 24 Hours)     Procedure Component Value Units Date/Time    XR Chest AP [[id number]] Collected: 01/12/22 0101     Updated: 01/12/22 0125    Narrative:      EXAM:    XR Chest, 1 View    CLINICAL HISTORY:    The patient is 86 years old and is Male; COVID Evaluation,  Cough, Fever    TECHNIQUE:    Frontal view of the chest.    COMPARISON:    XR CHEST dated June 25 2021    FINDINGS:    LUNGS:  There are slightly low lung volumes. No consolidation.    PLEURAL SPACE:  Unremarkable.  No pneumothorax.    HEART:  Unremarkable.  No cardiomegaly.    MEDIASTINUM:  Unremarkable.    BONES/JOINTS:  There are degenerative changes of the spine.    VASCULATURE:  Atherosclerosis of the aorta is present.    UPPER ABDOMEN:  Elevation of the right hemidiaphragm is  present.      Impression:        No acute cardiopulmonary process.    Electronically signed by:  Lenka Santos MD  1/12/2022 1:24 AM  CST Workstation: [id number]           Assessment / Plan       Hospital Problem List:    Acute kidney injury on chronic kidney disease stage III.  Metabolic encephalopathy.  Hypernatremia.  Hypermagnesemia  Alzheimer's dementia.  Hypertension.  Elevated troponins of unclear significance  Hyperglycemia probably reactive    Patient to be admitted to the inpatient services, he is clinically dehydrated with worsening renal function, he will be IV fluid rehydrated with monitoring of respiratory status and input output charting.  Patient has dementia and is probably not too far off his baseline no family available for additional clarification,  elevated troponins likely due to worsening renal function, monitor and correct electrolytes as indicated, check an A1c.  Other ongoing multiple medical issues to be addressed as needed..    .     Ceferino Diaz MD  01/12/22  03:52 CST      Dictated Utilizing Dragon Dictation          Laceration Repair

## 2022-01-13 LAB
ALBUMIN SERPL-MCNC: 3.6 G/DL (ref 3.5–5.2)
ALBUMIN/GLOB SERPL: 1.1 G/DL
ALP SERPL-CCNC: 94 U/L (ref 39–117)
ALT SERPL W P-5'-P-CCNC: 14 U/L (ref 1–41)
ANION GAP SERPL CALCULATED.3IONS-SCNC: 12 MMOL/L (ref 5–15)
AST SERPL-CCNC: 32 U/L (ref 1–40)
BASOPHILS # BLD AUTO: 0.03 10*3/MM3 (ref 0–0.2)
BASOPHILS NFR BLD AUTO: 0.4 % (ref 0–1.5)
BILIRUB SERPL-MCNC: 0.3 MG/DL (ref 0–1.2)
BUN SERPL-MCNC: 44 MG/DL (ref 8–23)
BUN/CREAT SERPL: 17.5 (ref 7–25)
CALCIUM SPEC-SCNC: 8.9 MG/DL (ref 8.6–10.5)
CHLORIDE SERPL-SCNC: 109 MMOL/L (ref 98–107)
CO2 SERPL-SCNC: 25 MMOL/L (ref 22–29)
CREAT SERPL-MCNC: 2.52 MG/DL (ref 0.76–1.27)
DEPRECATED RDW RBC AUTO: 49.1 FL (ref 37–54)
EOSINOPHIL # BLD AUTO: 0.22 10*3/MM3 (ref 0–0.4)
EOSINOPHIL NFR BLD AUTO: 3 % (ref 0.3–6.2)
ERYTHROCYTE [DISTWIDTH] IN BLOOD BY AUTOMATED COUNT: 13.7 % (ref 12.3–15.4)
GFR SERPL CREATININE-BSD FRML MDRD: 24 ML/MIN/1.73
GLOBULIN UR ELPH-MCNC: 3.2 GM/DL
GLUCOSE SERPL-MCNC: 96 MG/DL (ref 65–99)
HCT VFR BLD AUTO: 30.7 % (ref 37.5–51)
HGB BLD-MCNC: 9.6 G/DL (ref 13–17.7)
IMM GRANULOCYTES # BLD AUTO: 0.03 10*3/MM3 (ref 0–0.05)
IMM GRANULOCYTES NFR BLD AUTO: 0.4 % (ref 0–0.5)
LYMPHOCYTES # BLD AUTO: 1.73 10*3/MM3 (ref 0.7–3.1)
LYMPHOCYTES NFR BLD AUTO: 23.4 % (ref 19.6–45.3)
MCH RBC QN AUTO: 30.5 PG (ref 26.6–33)
MCHC RBC AUTO-ENTMCNC: 31.3 G/DL (ref 31.5–35.7)
MCV RBC AUTO: 97.5 FL (ref 79–97)
MONOCYTES # BLD AUTO: 0.92 10*3/MM3 (ref 0.1–0.9)
MONOCYTES NFR BLD AUTO: 12.4 % (ref 5–12)
NEUTROPHILS NFR BLD AUTO: 4.46 10*3/MM3 (ref 1.7–7)
NEUTROPHILS NFR BLD AUTO: 60.4 % (ref 42.7–76)
NRBC BLD AUTO-RTO: 0 /100 WBC (ref 0–0.2)
PLATELET # BLD AUTO: 207 10*3/MM3 (ref 140–450)
PMV BLD AUTO: 11.6 FL (ref 6–12)
POTASSIUM SERPL-SCNC: 4.6 MMOL/L (ref 3.5–5.2)
PROT SERPL-MCNC: 6.8 G/DL (ref 6–8.5)
RBC # BLD AUTO: 3.15 10*6/MM3 (ref 4.14–5.8)
SODIUM SERPL-SCNC: 146 MMOL/L (ref 136–145)
WBC NRBC COR # BLD: 7.39 10*3/MM3 (ref 3.4–10.8)

## 2022-01-13 PROCEDURE — 96372 THER/PROPH/DIAG INJ SC/IM: CPT

## 2022-01-13 PROCEDURE — G0378 HOSPITAL OBSERVATION PER HR: HCPCS

## 2022-01-13 PROCEDURE — 96361 HYDRATE IV INFUSION ADD-ON: CPT

## 2022-01-13 PROCEDURE — 92526 ORAL FUNCTION THERAPY: CPT | Performed by: SPEECH-LANGUAGE PATHOLOGIST

## 2022-01-13 PROCEDURE — 25010000002 HEPARIN (PORCINE) PER 1000 UNITS: Performed by: INTERNAL MEDICINE

## 2022-01-13 PROCEDURE — 85025 COMPLETE CBC W/AUTO DIFF WBC: CPT | Performed by: NURSE PRACTITIONER

## 2022-01-13 PROCEDURE — 80053 COMPREHEN METABOLIC PANEL: CPT | Performed by: NURSE PRACTITIONER

## 2022-01-13 RX ADMIN — SODIUM CHLORIDE, PRESERVATIVE FREE 10 ML: 5 INJECTION INTRAVENOUS at 20:30

## 2022-01-13 RX ADMIN — RISPERIDONE 0.5 MG: 0.5 TABLET ORAL at 08:48

## 2022-01-13 RX ADMIN — Medication 1000 UNITS: at 08:48

## 2022-01-13 RX ADMIN — HEPARIN SODIUM 5000 UNITS: 5000 INJECTION, SOLUTION INTRAVENOUS; SUBCUTANEOUS at 20:29

## 2022-01-13 RX ADMIN — VERAPAMIL HYDROCHLORIDE 120 MG: 120 TABLET, FILM COATED, EXTENDED RELEASE ORAL at 08:51

## 2022-01-13 RX ADMIN — SODIUM CHLORIDE 100 ML/HR: 4.5 INJECTION, SOLUTION INTRAVENOUS at 17:25

## 2022-01-13 RX ADMIN — SENNOSIDES 1 TABLET: 8.6 TABLET, FILM COATED ORAL at 20:29

## 2022-01-13 RX ADMIN — HEPARIN SODIUM 5000 UNITS: 5000 INJECTION, SOLUTION INTRAVENOUS; SUBCUTANEOUS at 08:50

## 2022-01-13 RX ADMIN — QUETIAPINE FUMARATE 50 MG: 25 TABLET, FILM COATED ORAL at 08:48

## 2022-01-13 RX ADMIN — ROSUVASTATIN CALCIUM 20 MG: 20 TABLET, FILM COATED ORAL at 20:29

## 2022-01-13 RX ADMIN — SODIUM CHLORIDE, PRESERVATIVE FREE 10 ML: 5 INJECTION INTRAVENOUS at 09:46

## 2022-01-13 RX ADMIN — SENNOSIDES 1 TABLET: 8.6 TABLET, FILM COATED ORAL at 08:47

## 2022-01-13 RX ADMIN — QUETIAPINE FUMARATE 50 MG: 25 TABLET, FILM COATED ORAL at 20:29

## 2022-01-13 RX ADMIN — LEVOTHYROXINE SODIUM 150 MCG: 150 TABLET ORAL at 05:41

## 2022-01-13 RX ADMIN — AMLODIPINE BESYLATE 5 MG: 5 TABLET ORAL at 08:48

## 2022-01-13 RX ADMIN — SODIUM CHLORIDE 100 ML/HR: 4.5 INJECTION, SOLUTION INTRAVENOUS at 07:16

## 2022-01-13 RX ADMIN — RISPERIDONE 0.5 MG: 0.5 TABLET ORAL at 20:29

## 2022-01-13 RX ADMIN — DONEPEZIL HYDROCHLORIDE 10 MG: 10 TABLET, FILM COATED ORAL at 20:29

## 2022-01-13 RX ADMIN — ASPIRIN 324 MG: 81 TABLET, CHEWABLE ORAL at 08:47

## 2022-01-13 RX ADMIN — SERTRALINE 100 MG: 50 TABLET, FILM COATED ORAL at 08:48

## 2022-01-13 NOTE — PLAN OF CARE
Problem: Fall Injury Risk  Goal: Absence of Fall and Fall-Related Injury  Outcome: Ongoing, Not Progressing  Intervention: Identify and Manage Contributors to Fall Injury Risk  Recent Flowsheet Documentation  Taken 1/12/2022 2033 by Charisma Reyes RN  Medication Review/Management: medications reviewed  Intervention: Promote Injury-Free Environment  Recent Flowsheet Documentation  Taken 1/12/2022 2033 by Charisma Reyes RN  Safety Promotion/Fall Prevention:   activity supervised   assistive device/personal items within reach   clutter free environment maintained   fall prevention program maintained   lighting adjusted   nonskid shoes/slippers when out of bed   room organization consistent   safety round/check completed   toileting scheduled     Problem: Adult Inpatient Plan of Care  Goal: Plan of Care Review  Outcome: Ongoing, Not Progressing  Flowsheets (Taken 1/12/2022 2040)  Progress: no change  Plan of Care Reviewed With: patient     Problem: Adult Inpatient Plan of Care  Goal: Absence of Hospital-Acquired Illness or Injury  Outcome: Ongoing, Progressing  Intervention: Identify and Manage Fall Risk  Recent Flowsheet Documentation  Taken 1/12/2022 2033 by Charisma Reyes RN  Safety Promotion/Fall Prevention:   activity supervised   assistive device/personal items within reach   clutter free environment maintained   fall prevention program maintained   lighting adjusted   nonskid shoes/slippers when out of bed   room organization consistent   safety round/check completed   toileting scheduled  Intervention: Prevent Skin Injury  Recent Flowsheet Documentation  Taken 1/12/2022 2033 by Charisma Reyes RN  Body Position:   turned   supine, legs elevated  Goal: Optimal Comfort and Wellbeing  Outcome: Ongoing, Progressing  Intervention: Provide Person-Centered Care  Recent Flowsheet Documentation  Taken 1/12/2022 2033 by Charisma Reyes RN  Trust Relationship/Rapport:   care explained   emotional support provided   questions  encouraged   reassurance provided     Problem: Skin Injury Risk Increased  Goal: Skin Health and Integrity  Outcome: Ongoing, Progressing  Intervention: Optimize Skin Protection  Recent Flowsheet Documentation  Taken 1/12/2022 2033 by Charisma Reyes RN  Head of Bed (HOB): HOB at 45 degrees     Problem: Hypertension Comorbidity  Goal: Blood Pressure in Desired Range  Intervention: Maintain Hypertension-Management Strategies  Recent Flowsheet Documentation  Taken 1/12/2022 2033 by Charisma Reyes RN  Medication Review/Management: medications reviewed   Goal Outcome Evaluation:  Plan of Care Reviewed With: patient        Progress: no change

## 2022-01-13 NOTE — PLAN OF CARE
Goal Outcome Evaluation:  Plan of Care Reviewed With: patient        Progress: no change  Outcome Summary: Pt VSS.  Pt resting calm/comfortably at this time.  Pt disorientedx4 at this time which is baseline. Will continue to monitor.

## 2022-01-13 NOTE — PROGRESS NOTES
[facility] Medicine Services  INPATIENT PROGRESS NOTE    Length of Stay: 0  Date of Admission: 1/12/2022  Primary Care Physician: Anthony Mack MD    Subjective   Chief Complaint: altered mental status   HPI:  86 year old male with past medical history of HTN, HLD, dementia who presented from SNF on 1/12/22 with altered mental status and poor oral intake.  He is currently admitted for issues including hypernatremia and acute kidney injury.  During today's visit, patient is awake, alert but disoriented.  He will answer yes or no questions but unable to hold a meaningful conversation.     Review of Systems   Unable to perform ROS: Dementia        All pertinent negatives and positives are as above. All other systems have been reviewed and are negative unless otherwise stated.     Objective    Temp:  [96.8 °F (36 °C)-97.9 °F (36.6 °C)] 97.9 °F (36.6 °C)  Heart Rate:  [57-86] 60  Resp:  [18] 18  BP: (104-208)/() 155/77    Physical Exam  Vitals and nursing note reviewed.   Constitutional:       General: He is not in acute distress.     Appearance: He is ill-appearing.      Comments: Chronically ill appearing   HENT:      Head: Normocephalic and atraumatic.      Right Ear: External ear normal.      Left Ear: External ear normal.      Nose: Nose normal.      Mouth/Throat:      Mouth: Mucous membranes are moist.      Pharynx: Oropharynx is clear.   Eyes:      General: No scleral icterus.        Right eye: No discharge.         Left eye: No discharge.      Conjunctiva/sclera: Conjunctivae normal.   Cardiovascular:      Rate and Rhythm: Normal rate and regular rhythm.      Pulses: Normal pulses.      Heart sounds: Normal heart sounds. No murmur heard.  No friction rub. No gallop.    Pulmonary:      Effort: Pulmonary effort is normal. No respiratory distress.      Breath sounds: Normal breath sounds. No stridor. No wheezing, rhonchi or rales.   Abdominal:      General: Bowel  sounds are normal. There is no distension.      Palpations: Abdomen is soft.      Tenderness: There is no abdominal tenderness.   Musculoskeletal:         General: Normal range of motion.      Cervical back: Normal range of motion and neck supple.      Right lower leg: Edema present.      Left lower leg: Edema present.      Comments: Trace BLE edema   Skin:     General: Skin is warm and dry.   Neurological:      General: No focal deficit present.      Mental Status: He is alert. He is disoriented.   Psychiatric:         Mood and Affect: Mood normal.         Behavior: Behavior normal.             Results Review:  I have reviewed the labs, radiology results, and diagnostic studies.    Laboratory Data:   Results from last 7 days   Lab Units 01/13/22  0503 01/12/22  0626 01/12/22  0111   SODIUM mmol/L 146* 146* 147*   POTASSIUM mmol/L 4.6 4.5 4.6   CHLORIDE mmol/L 109* 109* 108*   CO2 mmol/L 25.0 25.0 27.0   BUN mg/dL 44* 57* 62*   CREATININE mg/dL 2.52* 2.69* 3.21*   GLUCOSE mg/dL 96 109* 122*   CALCIUM mg/dL 8.9 8.9 9.2   BILIRUBIN mg/dL 0.3  --  0.3   ALK PHOS U/L 94  --  105   ALT (SGPT) U/L 14  --  16   AST (SGOT) U/L 32  --  32   ANION GAP mmol/L 12.0 12.0 12.0     Estimated Creatinine Clearance: 21 mL/min (A) (by C-G formula based on SCr of 2.52 mg/dL (H)).  Results from last 7 days   Lab Units 01/12/22  0111   MAGNESIUM mg/dL 2.7*         Results from last 7 days   Lab Units 01/13/22  0503 01/12/22  0626 01/12/22  0111   WBC 10*3/mm3 7.39 9.53 9.06   HEMOGLOBIN g/dL 9.6* 10.6* 10.6*   HEMATOCRIT % 30.7* 33.0* 33.3*   PLATELETS 10*3/mm3 207 233 245           Culture Data:   Blood Culture   Date Value Ref Range Status   01/12/2022 No growth at 24 hours  Preliminary   01/12/2022 No growth at 24 hours  Preliminary     No results found for: URINECX  No results found for: RESPCX  No results found for: WOUNDCX  No results found for: STOOLCX  No components found for: BODYFLD    Radiology Data:   Imaging Results (Last 24  Hours)     Procedure Component Value Units Date/Time    CT Head Without Contrast [[id number]] Collected: 01/12/22 0929     Updated: 01/12/22 1001    Narrative:      CT head without IV contrast January 12, 2022    INDICATION: Acute onset confusion    TECHNIQUE: Spiral images obtained from foramen magnum through  vertex without IV contrast. Sagittal, axial and coronal  reformatted images generated retrospectively.    FINDINGS:  Postoperative changes right parieto-occipital region craniotomy  overlying focal encephalomalacia. Appearance stable compared with  March 13, 2019.  Atrophy with evidence of chronic small vessel white matter  ischemic change.  Asymmetric left cerebellar atrophy compared to the right.  No definite acute parenchymal pathology appreciated.  Mild probable chronic bilateral ethmoid sinus inflammation.  Mastoids clear.  No acute bony abnormality.      Impression:      Atrophy with evidence of chronic small vessel white matter  ischemic change.  Postoperative  right parieto-occipital region.  No definite acute intracranial pathology.  Probable chronic bilateral ethmoid sinus inflammation.    Electronically signed by:  Mukund Thrasher MD  1/12/2022 9:59 AM  CST Workstation: [id number]          I have reviewed the patient's current medications.     Assessment/Plan     Active Hospital Problems    Diagnosis    • Acute renal failure (HCC)        Plan:    1. Acute kidney injury on CKD: creatinine improving from 3.21 to 2.52.  Baseline approximately 2.1.    2. Hypernatremia: sodium improved to 146.    3. Advanced Alzheimer's dementia: confusion worse than baseline per report.  Awake, alert today.  Will speak in 1-2 word phrases.  Ct of head negative for any acute findings.       Discharge Planning: I expect patient to be discharged to SNF in 1-2 days.    I confirmed that the patient's Advance Care Plan is present, code status is documented, or surrogate decision maker is listed in the patient's medical  record.          This document has been electronically signed by NICHOLAS Levine on January 13, 2022 09:57 CST

## 2022-01-13 NOTE — THERAPY TREATMENT NOTE
Acute Care - Speech Language Pathology   Swallow Treatment Note [facility]     Patient Name: George Puga Jr.  : 1935  MRN: [id number]  Today's Date: 2022               Admit Date: 2022    Visit Dx:     ICD-10-CM ICD-9-CM   1. Acute renal failure, unspecified acute renal failure type (HCC)  N17.9 584.9   2. Altered mental status, unspecified altered mental status type  R41.82 780.97   3. Oral phase dysphagia  R13.11 787.21     Patient Active Problem List   Diagnosis   • Elevated lactic acid level   • Generalized weakness   • Alzheimer's dementia without behavioral disturbance (HCC)   • LUZ (acute kidney injury) (HCC)   • Acute renal failure (HCC)     Past Medical History:   Diagnosis Date   • Alzheimer's dementia (HCC)    • Hyperlipidemia    • Hypertension      Past Surgical History:   Procedure Laterality Date   • KNEE SURGERY Left        SLP Recommendation and Plan  SLP Swallowing Diagnosis: mild-moderate, oral dysphagia (22)  SLP Diet Recommendation: puree, thin liquids (22)  Recommended Precautions and Strategies: upright posture during/after eating, small bites of food and sips of liquid, alternate between small bites of food and sips of liquid, general aspiration precautions, fatigue precautions, assist with feeding (22)  SLP Rec. for Method of Medication Administration: meds crushed, with pudding or applesauce (22)     Monitor for Signs of Aspiration: yes, notify SLP if any concerns (22)     Swallow Criteria for Skilled Therapeutic Interventions Met: demonstrates skilled criteria (22)  Anticipated Discharge Disposition (SLP): skilled nursing facility (22)  Rehab Potential/Prognosis, Swallowing: adequate, monitor progress closely (22)  Therapy Frequency (Swallow): 1 day per week, 2 days per week (22)  Predicted Duration Therapy Intervention (Days): 1 day, 3 days (22)      Daily Summary of Progress (SLP): progress toward functional goals as expected (01/13/22 1320)               Treatment Assessment (SLP): ST dysphagia treatment provided this date.  pt tolerates puree and thin liquid via straw, but has decreased intake overall.  nsg states no intake at am meal d/t confusion and 50% at noon.  SLP will f/u for diet toleration (01/13/22 1320)  Plan for Continued Treatment (SLP): continue treatment per plan of care (01/13/22 1320)         Plan of Care Reviewed With: patient  Progress: no change  Outcome Summary: ST dysphagia treatment provided this date.  pt tolerates puree and thin liquid via straw, but has decreased intake overall.  nsg states no intake at am meal d/t confusion and 50% at noon.  SLP will f/u for diet toleration      SWALLOW EVALUATION (last 72 hours)     SLP Adult Swallow Evaluation     Row Name 01/13/22 1320 01/12/22 1300                Rehab Evaluation    Document Type therapy note (daily note)  -EC evaluation  -CK       Total Evaluation Minutes, SLP -- 24  -CK       Subjective Information no complaints  -EC no complaints  did not communicate  -CK       Patient Observations agree to therapy; poorly cooperative; lethargic  -EC alert; cooperative  -CK       Patient/Family/Caregiver Comments/Observations none  -EC No family present at bedside  -CK       Patient Effort adequate  -EC adequate  -CK                General Information    Patient Profile Reviewed yes  -EC yes  -CK       Pertinent History Of Current Problem -- Pt admitted w/AMS; has advanced dementia; decline in PO intake the past two days; able to feed self prior to AMS  -CK       Current Method of Nutrition pureed; thin liquids  -EC NPO  -CK       Prior Level of Function-Communication cognitive-linguistic impairment  dementia  -EC cognitive-linguistic impairment  dementia  -CK       Prior Level of Function-Swallowing unknown  -EC unknown  -CK       Barriers to Rehab cognitive status  -EC cognitive status   -CK       Patient's Goals for Discharge -- patient could not state  -CK                Pain    Additional Documentation -- Pain Scale: FACES Pre/Post-Treatment (Group)  -CK                Pain Scale: FACES Pre/Post-Treatment    Pain: FACES Scale, Pretreatment 0-->no hurt  -EC 0-->no hurt  -CK       Posttreatment Pain Rating 0-->no hurt  -EC 0-->no hurt  -CK                Oral Motor Structure and Function    Dentition Assessment natural, present and adequate  -EC natural, present and adequate  -CK       Secretion Management WNL/WFL  -EC WNL/WFL  -CK       Mucosal Quality moist, healthy  -EC cracked; dry  -CK       Volitional Swallow -- unable to elicit  -CK       Volitional Cough unable to elicit  -EC unable to elicit  -CK                General Eating/Swallowing Observations    Respiratory Support Currently in Use room air  -EC room air  -CK       Eating/Swallowing Skills fed by SLP  -EC fed by SLP  -CK       Positioning During Eating upright 90 degree; upright in bed  -EC upright 90 degree; upright in bed  -CK       Utensils Used spoon; straw  -EC spoon; straw  -CK       Consistencies Trialed pureed; thin liquids  -EC pureed; ice chips; thin liquids  -CK                Clinical Swallow Eval    Oral Prep Phase -- impaired  -CK       Oral Transit -- WFL  -CK       Oral Residue -- impaired  -CK       Pharyngeal Phase -- no overt signs/symptoms of pharyngeal impairment  -CK       Esophageal Phase -- unremarkable  -CK       Clinical Swallow Evaluation Summary Pt with more lethargy this date.  education with nsg notes pt continues to be easily startled and upset.  less distraction and full feed assit/encouragement for best PO.  -EC Pt seen for skilled ST services this date to assess swallow function 2/2 AMS and PMH of dementia.  Pt was non-communicative, but did accept bolus from SLP when presented.  Pt consumed ice chips and thin liquids via spoon and straw w/no overt s/s of aspiration.  Pt consumed puree w/poor bolus  manipulation and oral residue.  Pt also demonstrates difficulty accepting bolus initially d/t cognition.  Pt accepts bolus more easily when bolus is presented slowly and tactile cue provided to prepare pt for task of accpeting PO solids/liquids.  SLP also provided verbal cues to assist w/prepping for bolus acceptance.  SLP recommends advancing diet to puree/thin w/feeding assist, present food/liquids slowly, and cyclic eating to improve PO intake and safety.  SLP to f/u w/pt for 1-2 sessions for diet safety and toleration.  SLP unable to educate pt d/t cognitive status.  SLP updated informational board w/diet and recommended strategies for safe feeding.  -CK                Oral Prep Concerns    Oral Prep Concerns -- incomplete bolus preparation  -CK       Incomplete Bolus Preparation -- pudding  -CK                Oral Residue Concerns    Oral Residue Concerns -- diffuse residue throughout oral cavity  -CK       Diffuse Residue Throughout Oral Cavity -- pudding  -CK                SLP Evaluation Clinical Impression    SLP Swallowing Diagnosis mild-moderate; oral dysphagia  -EC mild-moderate; oral dysphagia  -CK       Functional Impact risk of aspiration/pneumonia; risk of malnutrition; risk of dehydration  -EC risk of aspiration/pneumonia; risk of malnutrition; risk of dehydration  -CK       Rehab Potential/Prognosis, Swallowing adequate, monitor progress closely  -EC adequate, monitor progress closely  -CK       Swallow Criteria for Skilled Therapeutic Interventions Met demonstrates skilled criteria  -EC demonstrates skilled criteria  -CK                SLP Treatment Clinical Impressions    Treatment Assessment (SLP) ST dysphagia treatment provided this date.  pt tolerates puree and thin liquid via straw, but has decreased intake overall.  nsg states no intake at am meal d/t confusion and 50% at noon.  SLP will f/u for diet toleration  -EC oral dysphagia  -CK       Daily Summary of Progress (SLP) progress toward  functional goals as expected  -EC --       Barriers to Overall Progress (SLP) Cognitive status; Baseline deficits  -EC Cognitive status; Baseline deficits  -CK       Plan for Continued Treatment (SLP) continue treatment per plan of care  -EC --       Care Plan Review evaluation/treatment results reviewed; care plan/treatment goals reviewed; risks/benefits reviewed; current/potential barriers reviewed; patient/other agree to care plan  no evidence of learning  -EC --  unable to educate d/t cognition  -CK                Recommendations    Therapy Frequency (Swallow) 1 day per week; 2 days per week  -EC 1 day per week; 2 days per week  -CK       Predicted Duration Therapy Intervention (Days) 1 day; 3 days  -EC 1 day; 3 days  -CK       SLP Diet Recommendation puree; thin liquids  -EC puree; thin liquids  -CK       Recommended Precautions and Strategies upright posture during/after eating; small bites of food and sips of liquid; alternate between small bites of food and sips of liquid; general aspiration precautions; fatigue precautions; assist with feeding  -EC upright posture during/after eating; small bites of food and sips of liquid; alternate between small bites of food and sips of liquid; general aspiration precautions; fatigue precautions; assist with feeding  -CK       Oral Care Recommendations Oral Care before breakfast, after meals and PRN  -EC Oral Care before breakfast, after meals and PRN  -CK       SLP Rec. for Method of Medication Administration meds crushed; with pudding or applesauce  -EC meds crushed; with pudding or applesauce  -CK       Monitor for Signs of Aspiration yes; notify SLP if any concerns  -EC yes; notify SLP if any concerns  -CK       Anticipated Discharge Disposition (SLP) skilled nursing facility  -EC skilled nursing facility  -CK                Swallow Goals (SLP)    Oral Nutrition/Hydration Goal Selection (SLP) oral nutrition/hydration, SLP goal 1  -EC --                Oral  Nutrition/Hydration Goal 1 (SLP)    Oral Nutrition/Hydration Goal 1, SLP pt to tolerate recommended diet for safe and adequate nutrition/hydration  -EC --       Time Frame (Oral Nutrition/Hydration Goal 1, SLP) 2 days  -EC --       Barriers (Oral Nutrition/Hydration Goal 1, SLP) cognition  -EC --       Progress/Outcomes (Oral Nutrition/Hydration Goal 1, SLP) goal partially met; continuing progress toward goal; goal ongoing  -EC --             User Key  (r) = Recorded By, (t) = Taken By, (c) = Cosigned By    Initials Name Effective Dates    Alejandrina Mcgrath CCC-SLP 06/16/21 -     CK Estrella Jane, MS CCC-SLP 06/16/21 -                 EDUCATION  The patient has been educated in the following areas:   Dysphagia (Swallowing Impairment) Modified Diet Instruction.        SLP GOALS     Row Name 01/13/22 1320             Oral Nutrition/Hydration Goal 1 (SLP)    Oral Nutrition/Hydration Goal 1, SLP pt to tolerate recommended diet for safe and adequate nutrition/hydration  -EC      Time Frame (Oral Nutrition/Hydration Goal 1, SLP) 2 days  -EC      Barriers (Oral Nutrition/Hydration Goal 1, SLP) cognition  -EC      Progress/Outcomes (Oral Nutrition/Hydration Goal 1, SLP) goal partially met; continuing progress toward goal; goal ongoing  -EC            User Key  (r) = Recorded By, (t) = Taken By, (c) = Cosigned By    Initials Name Provider Type    Alejandrina Mcgrath CCC-SLP Speech and Language Pathologist                   Time Calculation:    Time Calculation- SLP     Row Name 01/13/22 1341             Time Calculation- SLP    SLP Start Time 1320  -EC      SLP Stop Time 1344  -EC      SLP Time Calculation (min) 24 min  -EC      Total Timed Code Minutes- SLP 24 minute(s)  -EC      SLP Received On 01/13/22  -EC      SLP Goal Re-Cert Due Date 01/26/22  -EC              Untimed Charges    [id number]-ZZ Treatment Swallow Minutes 24  -EC              Total Minutes    Untimed Charges Total Minutes 24  -EC       Total  Minutes 24  -EC            User Key  (r) = Recorded By, (t) = Taken By, (c) = Cosigned By    Initials Name Provider Type    EC Alejandrina Romero CCC-SLP Speech and Language Pathologist                Therapy Charges for Today     Code Description Service Date Service Provider Modifiers Qty    [id number]  ST TREATMENT SWALLOW 2 1/13/2022 Alejandrina Romero CCC-SLP GN 1               REGINA Vilchis  1/13/2022

## 2022-01-13 NOTE — PLAN OF CARE
Goal Outcome Evaluation:  Plan of Care Reviewed With: patient     Pt VSS.  No s/s of pain or discomfort.  Pt resting comfortably.  Will continue to monitor.

## 2022-01-13 NOTE — PLAN OF CARE
Goal Outcome Evaluation:              Outcome Summary: Decreased po and mental status. Pureed/thin diet ordered, needs assist w/feeding. Added magic cup. Will monitor course.

## 2022-01-13 NOTE — CONSULTS
Adult Nutrition  Assessment    Patient Name:  George Puga Jr.  YOB: 1935  MRN: [id number]  Admit Date:  1/12/2022    Assessment Date:  1/13/2022    Comments:  87 y/o nursing home res w/dementia admitted d/t decreased po intake and decreased mental status. Per SLP eval, pt needs pureed/thin diet and assist at meals. RD added magic cups for added nixon/pro---will monitor intake. Labs reflective of CKD. Epic weights do not indicate any significant weight loss. Will monitor course and make recs as indicated.      Reason for Assessment     Row Name 01/13/22 1053          Reason for Assessment    Reason For Assessment identified at risk by screening criteria     Diagnosis nutrition related history     Identified At Risk by Screening Criteria MST SCORE 2+; reduced oral intake over the last month                Nutrition/Diet History     Row Name 01/13/22 1053          Nutrition/Diet History    Typical Food/Fluid Intake Pt is nursing home res w/dementia---unable to answer questions     Factors Affecting Nutritional Intake chewing difficulties/inability to chew food                Anthropometrics     Row Name 01/13/22 0406          Anthropometrics    Weight 70.6 kg (155 lb 11.2 oz)                Labs/Tests/Procedures/Meds     Row Name 01/13/22 1053          Labs/Procedures/Meds    Lab Results Reviewed reviewed, pertinent     Lab Results Comments Na 146, BUN 44, crea 2.52            Diagnostic Tests/Procedures    Diagnostic Test/Procedure Reviewed reviewed            Medications    Pertinent Medications Reviewed reviewed                  Estimated/Assessed Needs     Row Name 01/13/22 1054          Calculation Measurements    Weight Used For Calculations 75.3 kg (166 lb)            Estimated/Assessed Needs    Additional Documentation Protein Requirements (Group); KCAL/KG (Group); Fluid Requirements (Group)            KCAL/KG    KCAL/KG 25 Kcal/Kg (kcal)     25 Kcal/Kg (kcal) 1882.425            Protein  Requirements    Weight Used For Protein Calculations 75.3 kg (166 lb)     Est Protein Requirement Amount (gms/kg) 0.8 gm protein     Estimated Protein Requirements (gms/day) 60.24            Fluid Requirements    Fluid Requirements (mL/day) 1500     RDA Method (mL) 1500                Nutrition Prescription Ordered     Row Name 01/13/22 1055          Nutrition Prescription PO    Current PO Diet Pureed     Fluid Consistency Thin     Common Modifiers Cardiac                Evaluation of Received Nutrient/Fluid Intake     Row Name 01/13/22 1055          PO Evaluation    % PO Intake 0-bites noted                     Electronically signed by:  Mylene Jiménez RD  01/13/22 10:59 CST

## 2022-01-13 NOTE — PLAN OF CARE
Goal Outcome Evaluation:  Plan of Care Reviewed With: patient        Progress: no change  Outcome Summary: ST dysphagia treatment provided this date.  pt tolerates puree and thin liquid via straw, but has decreased intake overall.  nsg states no intake at am meal d/t confusion and 50% at noon.  SLP will f/u for diet toleration

## 2022-01-14 VITALS
RESPIRATION RATE: 18 BRPM | DIASTOLIC BLOOD PRESSURE: 79 MMHG | TEMPERATURE: 97.8 F | OXYGEN SATURATION: 96 % | BODY MASS INDEX: 21.56 KG/M2 | WEIGHT: 150.6 LBS | HEIGHT: 70 IN | SYSTOLIC BLOOD PRESSURE: 139 MMHG | HEART RATE: 59 BPM

## 2022-01-14 LAB
ANION GAP SERPL CALCULATED.3IONS-SCNC: 10 MMOL/L (ref 5–15)
BUN SERPL-MCNC: 34 MG/DL (ref 8–23)
BUN/CREAT SERPL: 16.2 (ref 7–25)
CALCIUM SPEC-SCNC: 8.7 MG/DL (ref 8.6–10.5)
CHLORIDE SERPL-SCNC: 107 MMOL/L (ref 98–107)
CO2 SERPL-SCNC: 24 MMOL/L (ref 22–29)
CREAT SERPL-MCNC: 2.1 MG/DL (ref 0.76–1.27)
GFR SERPL CREATININE-BSD FRML MDRD: 30 ML/MIN/1.73
GLUCOSE SERPL-MCNC: 94 MG/DL (ref 65–99)
POTASSIUM SERPL-SCNC: 4.1 MMOL/L (ref 3.5–5.2)
SARS-COV-2 RNA RESP QL NAA+PROBE: NOT DETECTED
SODIUM SERPL-SCNC: 141 MMOL/L (ref 136–145)

## 2022-01-14 PROCEDURE — 96372 THER/PROPH/DIAG INJ SC/IM: CPT

## 2022-01-14 PROCEDURE — 96361 HYDRATE IV INFUSION ADD-ON: CPT

## 2022-01-14 PROCEDURE — 92526 ORAL FUNCTION THERAPY: CPT | Performed by: SPEECH-LANGUAGE PATHOLOGIST

## 2022-01-14 PROCEDURE — 87635 SARS-COV-2 COVID-19 AMP PRB: CPT | Performed by: NURSE PRACTITIONER

## 2022-01-14 PROCEDURE — 25010000002 HEPARIN (PORCINE) PER 1000 UNITS: Performed by: INTERNAL MEDICINE

## 2022-01-14 PROCEDURE — 80048 BASIC METABOLIC PNL TOTAL CA: CPT | Performed by: NURSE PRACTITIONER

## 2022-01-14 PROCEDURE — G0378 HOSPITAL OBSERVATION PER HR: HCPCS

## 2022-01-14 RX ORDER — ASPIRIN 81 MG/1
324 TABLET, CHEWABLE ORAL DAILY
Start: 2022-01-15

## 2022-01-14 RX ORDER — AMLODIPINE BESYLATE 5 MG/1
5 TABLET ORAL
Qty: 30 TABLET | Refills: 0 | Status: SHIPPED | OUTPATIENT
Start: 2022-01-15

## 2022-01-14 RX ADMIN — VERAPAMIL HYDROCHLORIDE 120 MG: 120 TABLET, FILM COATED, EXTENDED RELEASE ORAL at 09:51

## 2022-01-14 RX ADMIN — SENNOSIDES 1 TABLET: 8.6 TABLET, FILM COATED ORAL at 09:49

## 2022-01-14 RX ADMIN — SERTRALINE 100 MG: 50 TABLET, FILM COATED ORAL at 09:49

## 2022-01-14 RX ADMIN — ASPIRIN 324 MG: 81 TABLET, CHEWABLE ORAL at 09:50

## 2022-01-14 RX ADMIN — HEPARIN SODIUM 5000 UNITS: 5000 INJECTION, SOLUTION INTRAVENOUS; SUBCUTANEOUS at 09:51

## 2022-01-14 RX ADMIN — QUETIAPINE FUMARATE 50 MG: 25 TABLET, FILM COATED ORAL at 09:49

## 2022-01-14 RX ADMIN — AMLODIPINE BESYLATE 5 MG: 5 TABLET ORAL at 09:49

## 2022-01-14 RX ADMIN — LEVOTHYROXINE SODIUM 150 MCG: 150 TABLET ORAL at 06:08

## 2022-01-14 RX ADMIN — Medication 1000 UNITS: at 09:49

## 2022-01-14 RX ADMIN — RISPERIDONE 0.5 MG: 0.5 TABLET ORAL at 09:49

## 2022-01-14 RX ADMIN — SODIUM CHLORIDE 100 ML/HR: 4.5 INJECTION, SOLUTION INTRAVENOUS at 03:00

## 2022-01-14 NOTE — NURSING NOTE
"Physicians Statement of Medical Necessity for  Ambulance Transportation    GENERAL INFORMATION     Name: George Puga Jr.  YOB: 1935  Medicare #: [id number]  Transport Date: 1/14/2022 (Valid for round trips this date, or for scheduled repetitive trips for 60 days from the date signed below.)  Origin: [facility] room 310  Destination: [facility] unit room 124   Is the Patient's stay covered under Medicare Part A (PPS/DRG?)Yes  Closest appropriate facility? Yes  If this a hosp-hosp transfer? No  Is this a hospice patient? No    MEDICAL NECESSITY QUESTIONAIRE    Ambulance Transportation is medically necessary only if other means of transportation are contraindicated or would be potentially harmful to the patient.  To meet this requirement, the patient must be either \"bed confined\" or suffer from a condition such that transport by means other than an ambulance is contraindicated by the patient's condition.  The following questions must be answered by the healthcare professional signing below for this form to be valid:     1) Describe the MEDICAL CONDITION (physical and/or mental) of this patient AT THE TIME OF AMBULANCE TRANSPORT that requires the patient to be transported in an ambulance, and why transport by other means is contraindicated by the patient's condition:  Alzheimer's, confusion      2) Is this patient \"bed confined\" as defined below?Yes   To be \"bed confined\" the patient must satisfy all three of the following criteria:  (1) unable to get up from bed without assistance; AND (2) unable to ambulate;  AND (3) unable to sit in a chair or wheelchair.  3) Can this patient safely be transported by car or wheelchair van (I.e., may safely sit during transport, without an attendant or monitoring?)No   4. In addition to completing questions 1-3 above, please check any of the following conditions that apply*:          *Note: supporting documentation for any boxes checked must be maintained in " the patient's medical records Patient is confused, Patient is combative, Medical attendant required and Unable to tolerate seated position for time needed to transport      SIGNATURE OF PHYSICIAN OR OTHER AUTHORIZED HEALTHCARE PROFESSIONAL    I certify that the above information is true and correct based on my evaluation of this patient, and represent that the patient requires transport by ambulance and that other forms of transport are contraindicated.  I understand that this information will be used by the Centers for Medicare and Medicaid Services (CMS) to support the determiniation of medical necessity for ambulance services, and I represent that I have personal knowledge of the patient's condition at the time of transport.       If this box is checked, I also certify that the patient is physically or mentally incapable of signing the ambulance service's claim form and that the institution with which I am affiliated has furnished care, services or assistance to the patient.  My signature below is made on behalf of the patient pursuant to 42 .36(b)(4). In accordance with 42 .37, the specific reason(s) that the patient is physically or mentally incapable of signing the claim for is as follows:     Signature of Physician or Healthcare Professional   Crystal Tran RN  Date/Time:   1/14/2022     (For Scheduled repetitive transport, this form is not valid for transports performed more than 60 days after this date).                                                                                                                                            --------------------------------------------------------------------------------------------  Printed Name and Credentials of Physician or Authorized Healthcare Professional     *Form must be signed by patient's attending physician for scheduled, repetitive transports,.  For non-repetitive ambulance transports, if unable to obtain the signature of the  attending physician, any of the following may sign (please select below):     Physician  Clinical Nurse Specialist  Registered Nurse     Physician Assistant  Discharge Planner  Licensed Practical Nurse     Nurse Practitioner   x

## 2022-01-14 NOTE — DISCHARGE PLACEMENT REQUEST
"Jose G Julienrocky Lock (86 y.o. Male)             Date of Birth Social Security Number Address Home Phone MRN    [id number]  [id number] [id number] [phone] [id number]    [employer] Marital Status             Anglican        Admission Date Admission Type Admitting Provider Attending Provider Department, Room/Bed    1/12/22 Emergency Ceferino Diaz MD Odeh, Osayawe N, MD [address], 310/1    Discharge Date Discharge Disposition Discharge Destination           Skilled Nursing Facility (DC - External)              Attending Provider: Ceferino Diaz MD    Allergies: Codeine, Namenda [Memantine]    Isolation: None   Infection: COVID Screen (preop/placement) (01/14/22)   Code Status: No CPR   Advance Care Planning Activity    Ht: 177.8 cm (70\")   Wt: 68.3 kg (150 lb 9.6 oz)    Admission Cmt: None   Principal Problem: None                Active Insurance as of 1/12/2022     Primary Coverage     Payor Plan Insurance Group Employer/Plan Group    MEDICARE MEDICARE A & B      Payor Plan Address Payor Plan Phone Number Payor Plan Fax Number Effective Dates    [address] [phone]  4/1/2000 - None Entered    [facility] [id number]       Subscriber Name Subscriber Birth Date Member ID       JOSE G JULIEN  1935 [id number]           Secondary Coverage     Payor Plan Insurance Group Employer/Plan Group     FOR LIFE  FOR LIFE  SUP       Payor Plan Address Payor Plan Phone Number Payor Plan Fax Number Effective Dates    [address] [phone]  10/1/2001 - None Entered    [facility] [address]       Subscriber Name Subscriber Birth Date Member ID       JOSE G JULIEN  1935 [id number]                 Emergency Contacts      (Rel.) Home Phone Work Phone Mobile Phone    JOSE MIGUEL JULIEN (Spouse) [phone] -- [phone]    KENDRICK SOTELO (Daughter) [phone] -- --            Insurance Information       "          MEDICARE/MEDICARE A & B Phone: [phone]    Subscriber: George Puga Jr. Subscriber#: [id number]    Group#: -- Precert#: --         FOR LIFE/ FOR LIFE [facility]  Phone: [phone]    Subscriber: George Puga Jr. Subscriber#: [id number]    Group#: -- Precert#: --

## 2022-01-14 NOTE — PLAN OF CARE
Problem: Adult Inpatient Plan of Care  Goal: Plan of Care Review  Outcome: Ongoing, Not Progressing  Flowsheets (Taken 1/13/2022 2332)  Progress: no change  Plan of Care Reviewed With: patient  Goal: Absence of Hospital-Acquired Illness or Injury  Outcome: Ongoing, Not Progressing  Intervention: Identify and Manage Fall Risk  Recent Flowsheet Documentation  Taken 1/13/2022 2300 by Charisma Reyes RN  Safety Promotion/Fall Prevention:   activity supervised   assistive device/personal items within reach   clutter free environment maintained   fall prevention program maintained   lighting adjusted   nonskid shoes/slippers when out of bed   room organization consistent   safety round/check completed   toileting scheduled  Taken 1/13/2022 2100 by Charisma Reyes RN  Safety Promotion/Fall Prevention:   activity supervised   assistive device/personal items within reach   clutter free environment maintained   fall prevention program maintained   lighting adjusted   nonskid shoes/slippers when out of bed   room organization consistent   safety round/check completed   toileting scheduled  Taken 1/13/2022 2000 by Charisma Reyes RN  Safety Promotion/Fall Prevention:   toileting scheduled   safety round/check completed   room organization consistent   patient off unit   nonskid shoes/slippers when out of bed  Intervention: Prevent Skin Injury  Recent Flowsheet Documentation  Taken 1/13/2022 2300 by Charisma Reyes RN  Body Position:   turned   legs elevated   side-lying, right  Taken 1/13/2022 2100 by Charisma Reyes RN  Body Position:   position maintained   side-lying, left   legs elevated  Taken 1/13/2022 2000 by Charisma Reyes RN  Body Position:   turned   side-lying, left   legs elevated  Intervention: Prevent and Manage VTE (venous thromboembolism) Risk  Recent Flowsheet Documentation  Taken 1/13/2022 2000 by Charisma Reyes RN  VTE Prevention/Management: (Heparin) other (see comments)  Goal: Readiness for Transition of  Care  Outcome: Ongoing, Not Progressing     Problem: Skin Injury Risk Increased  Goal: Skin Health and Integrity  Outcome: Ongoing, Not Progressing  Intervention: Optimize Skin Protection  Recent Flowsheet Documentation  Taken 1/13/2022 2300 by Charisma Reyes RN  Head of Bed (HOB): HOB at 30-45 degrees  Taken 1/13/2022 2100 by Charisma Reyes RN  Head of Bed (HOB): HOB at 30-45 degrees  Taken 1/13/2022 2000 by Charisma Reyes RN  Head of Bed (HOB): HOB at 30-45 degrees     Problem: Adult Inpatient Plan of Care  Goal: Optimal Comfort and Wellbeing  Outcome: Ongoing, Progressing  Intervention: Provide Person-Centered Care  Recent Flowsheet Documentation  Taken 1/13/2022 2000 by Charisma Reyes RN  Trust Relationship/Rapport:   care explained   choices provided   emotional support provided   empathic listening provided   questions answered   questions encouraged   reassurance provided   thoughts/feelings acknowledged     Problem: Hypertension Comorbidity  Goal: Blood Pressure in Desired Range  Outcome: Ongoing, Progressing  Intervention: Maintain Hypertension-Management Strategies  Recent Flowsheet Documentation  Taken 1/13/2022 2000 by Charisma Reyes RN  Medication Review/Management: medications reviewed     Problem: Fall Injury Risk  Goal: Absence of Fall and Fall-Related Injury  Outcome: Unable to Meet, Plan Revised  Intervention: Identify and Manage Contributors to Fall Injury Risk  Recent Flowsheet Documentation  Taken 1/13/2022 2000 by Charisma Reyes RN  Medication Review/Management: medications reviewed  Intervention: Promote Injury-Free Environment  Recent Flowsheet Documentation  Taken 1/13/2022 2300 by Charisma Reyes RN  Safety Promotion/Fall Prevention:   activity supervised   assistive device/personal items within reach   clutter free environment maintained   fall prevention program maintained   lighting adjusted   nonskid shoes/slippers when out of bed   room organization consistent   safety round/check completed    toileting scheduled  Taken 1/13/2022 2100 by Charisma Reyes, RN  Safety Promotion/Fall Prevention:   activity supervised   assistive device/personal items within reach   clutter free environment maintained   fall prevention program maintained   lighting adjusted   nonskid shoes/slippers when out of bed   room organization consistent   safety round/check completed   toileting scheduled  Taken 1/13/2022 2000 by Charisma Reyes, RN  Safety Promotion/Fall Prevention:   toileting scheduled   safety round/check completed   room organization consistent   patient off unit   nonskid shoes/slippers when out of bed   Goal Outcome Evaluation:  Plan of Care Reviewed With: patient        Progress: no change     Problem: Fall Injury Risk  Goal: Absence of Fall and Fall-Related Injury  Outcome: Unable to Meet, Plan Revised

## 2022-01-14 NOTE — PLAN OF CARE
"Goal Outcome Evaluation:  Plan of Care Reviewed With: other (see comments) (Unable to educate pt d/t cognitive deficits)        Progress: no change  Outcome Summary: Pt seen for skilled ST services this date.  Pt was seen w/AM meal and initially said, \"no\" to PO intake, but did accept w/some encouragement.  SLP fed pt first 2 bites and then Pt initiated self-feeding.  Pt consumed puree in large bites; however, managed safely.  Pt able to clear oral cavity and demonstrate timely AP transit of bolus w/o difficulty.  Pt consumed thin liquids via straw w/no overt s/s of aspiration.  SLP recommends d/c on current diet 2/2 cognitive barriers for advancing diet to more chewable solids.  SLP unable to educate pt d/t cognitive status.  "

## 2022-01-14 NOTE — DISCHARGE SUMMARY
[facility] Medicine Services  DISCHARGE SUMMARY       Date of Admission: 1/12/2022  Date of Discharge:  1/14/2022  Primary Care Physician: Anthony Mack MD    Presenting Problem/History of Present Illness:  Acute renal failure, unspecified acute renal failure type (HCC) [N17.9]  Altered mental status, unspecified altered mental status type [R41.82]       Final Discharge Diagnoses:  Active Hospital Problems    Diagnosis    • Acute renal failure (HCC)        Consults:   Consults     No orders found from 12/14/2021 to 1/13/2022.          Procedures Performed:                 Pertinent Test Results:   Lab Results (last 24 hours)     Procedure Component Value Units Date/Time    Basic Metabolic Panel [[id number]]  (Abnormal) Collected: 01/14/22 1111    Specimen: Blood Updated: 01/14/22 1152     Glucose 94 mg/dL      BUN 34 mg/dL      Creatinine 2.10 mg/dL      Sodium 141 mmol/L      Potassium 4.1 mmol/L      Chloride 107 mmol/L      CO2 24.0 mmol/L      Calcium 8.7 mg/dL      eGFR Non African Amer 30 mL/min/1.73      BUN/Creatinine Ratio 16.2     Anion Gap 10.0 mmol/L     Narrative:      GFR Normal >60  Chronic Kidney Disease <60  Kidney Failure <15      Blood Culture - Blood, Arm, Left [[id number]]  (Normal) Collected: 01/12/22 0233    Specimen: Blood from Arm, Left Updated: 01/14/22 0300     Blood Culture No growth at 2 days    Blood Culture - Blood, Arm, Left [[id number]]  (Normal) Collected: 01/12/22 0112    Specimen: Blood from Arm, Left Updated: 01/14/22 0115     Blood Culture No growth at 2 days        Imaging Results (All)     Procedure Component Value Units Date/Time    CT Head Without Contrast [[id number]] Collected: 01/12/22 0929     Updated: 01/12/22 1001    Narrative:      CT head without IV contrast January 12, 2022    INDICATION: Acute onset confusion    TECHNIQUE: Spiral images obtained from foramen magnum through  vertex without IV contrast. Sagittal, axial and  coronal  reformatted images generated retrospectively.    FINDINGS:  Postoperative changes right parieto-occipital region craniotomy  overlying focal encephalomalacia. Appearance stable compared with  March 13, 2019.  Atrophy with evidence of chronic small vessel white matter  ischemic change.  Asymmetric left cerebellar atrophy compared to the right.  No definite acute parenchymal pathology appreciated.  Mild probable chronic bilateral ethmoid sinus inflammation.  Mastoids clear.  No acute bony abnormality.      Impression:      Atrophy with evidence of chronic small vessel white matter  ischemic change.  Postoperative  right parieto-occipital region.  No definite acute intracranial pathology.  Probable chronic bilateral ethmoid sinus inflammation.    Electronically signed by:  Mukund Thrasher MD  1/12/2022 9:59 AM  CST Workstation: [id number]    XR Chest AP [[id number]] Collected: 01/12/22 0101     Updated: 01/12/22 0125    Narrative:      EXAM:    XR Chest, 1 View    CLINICAL HISTORY:    The patient is 86 years old and is Male; COVID Evaluation,  Cough, Fever    TECHNIQUE:    Frontal view of the chest.    COMPARISON:    XR CHEST dated June 25 2021    FINDINGS:    LUNGS:  There are slightly low lung volumes. No consolidation.    PLEURAL SPACE:  Unremarkable.  No pneumothorax.    HEART:  Unremarkable.  No cardiomegaly.    MEDIASTINUM:  Unremarkable.    BONES/JOINTS:  There are degenerative changes of the spine.    VASCULATURE:  Atherosclerosis of the aorta is present.    UPPER ABDOMEN:  Elevation of the right hemidiaphragm is  present.      Impression:        No acute cardiopulmonary process.    Electronically signed by:  Lenka Santos MD  1/12/2022 1:24 AM  CST Workstation: [id number]            Chief Complaint on Day of Discharge: None    Hospital Course:  86-year-old male with past medical history of hypertension, hyperlipidemia, dementia who presented from [facility]/12/[facility] with altered mental  "status and poor oral intake.  Patient was admitted for issues including hypernatremia and acute kidney injury.  Patient received IV fluid resuscitation during hospital stay and was evaluated by speech therapy.  With IV hydration, patient is now awake, alert, and tolerating diet.  Sodium levels have improved to 141 on day of discharge.  Creatinine level has improved from 3.21 on day of discharge to baseline of 2.1 on day of discharge.  Patient will return to skilled nursing facility today in stable condition with instructions for 1 week PCP follow-up.      Condition on Discharge: Able    Physical Exam on Discharge:  /68 (BP Location: Right arm, Patient Position: Lying)   Pulse 65   Temp 97.9 °F (36.6 °C) (Temporal)   Resp 18   Ht 177.8 cm (70\")   Wt 68.3 kg (150 lb 9.6 oz)   SpO2 98%   BMI 21.61 kg/m²   Physical Exam  Vitals and nursing note reviewed.   Constitutional:       General: He is not in acute distress.     Appearance: He is underweight. He is ill-appearing.      Comments: Chronically ill-appearing   HENT:      Head: Normocephalic and atraumatic.      Right Ear: External ear normal.      Left Ear: External ear normal.      Nose: Nose normal.      Mouth/Throat:      Mouth: Mucous membranes are moist.      Pharynx: Oropharynx is clear.   Eyes:      General: No scleral icterus.        Right eye: No discharge.         Left eye: No discharge.      Conjunctiva/sclera: Conjunctivae normal.   Cardiovascular:      Rate and Rhythm: Normal rate and regular rhythm.      Pulses: Normal pulses.      Heart sounds: Normal heart sounds. No murmur heard.  No friction rub. No gallop.    Pulmonary:      Effort: Pulmonary effort is normal. No respiratory distress.      Breath sounds: Normal breath sounds. No stridor. No wheezing, rhonchi or rales.   Abdominal:      General: Bowel sounds are normal. There is no distension.      Palpations: Abdomen is soft.      Tenderness: There is no abdominal tenderness. "   Musculoskeletal:         General: No swelling. Normal range of motion.      Cervical back: Normal range of motion and neck supple.   Skin:     General: Skin is warm and dry.   Neurological:      General: No focal deficit present.      Mental Status: He is alert.      Comments: Oriented to person place   Psychiatric:         Mood and Affect: Mood normal.         Behavior: Behavior normal.           Discharge Disposition:  Skilled Nursing Facility (DC - External)    Discharge Medications:     Discharge Medications      New Medications      Instructions Start Date   amLODIPine 5 MG tablet  Commonly known as: NORVASC   5 mg, Oral, Every 24 Hours Scheduled   Start Date: January 15, 2022        Changes to Medications      Instructions Start Date   aspirin 81 MG chewable tablet  What changed: how much to take   324 mg, Oral, Daily   Start Date: January 15, 2022        Continue These Medications      Instructions Start Date   chlorthalidone 12.5 MG half tablet  Commonly known as: HYGROTON   12.5 mg, Oral, Daily      Crestor 20 MG tablet  Generic drug: rosuvastatin   20 mg, Oral      levothyroxine 150 MCG tablet  Commonly known as: SYNTHROID, LEVOTHROID   150 mcg, Oral, Daily      MiraLax 17 GM/SCOOP powder  Generic drug: polyethylene glycol   17 g, Oral, Daily      QUEtiapine 25 MG tablet  Commonly known as: SEROquel   50 mg, Oral, 2 Times Daily, Pt takes one tablet at supper and one tablet an hour before bedtime.      senna 8.6 MG tablet  Commonly known as: SENOKOT   1 tablet, Oral, 2 Times Daily      sertraline 100 MG tablet  Commonly known as: ZOLOFT   100 mg, Oral, Daily      verapamil  MG 24 hr capsule  Commonly known as: VERELAN   120 mg, Oral      vitamin D 1.25 MG (23507 UT) capsule capsule  Commonly known as: ERGOCALCIFEROL   50,000 Units, Oral, Weekly         Stop These Medications    LORazepam 0.5 MG tablet  Commonly known as: ATIVAN            Discharge Diet:   Diet Instructions     Diet: Dysphagia; Thin  Liquids, No Restrictions; Pureed      Discharge Diet: Dysphagia    Fluid Consistency: Thin Liquids, No Restrictions    Pureed Options: Pureed          Activity at Discharge:   Activity Instructions     Activity as Tolerated            Discharge Care Plan/Instructions: Follow-up with PCP within 1 week of discharge.    Follow-up Appointments:   Additional Instructions for the Follow-ups that You Need to Schedule     Discharge Follow-up with PCP   As directed       Currently Documented PCP:    Anthony Mack MD    PCP Phone Number:    [phone]     Follow Up Details: one week            Follow-up Information     Anthony Mack MD .    Specialty: Family Medicine  Why: one week  Contact information:  [facility] [place] [address]  [phone]                         Test Results Pending at Discharge:   Pending Labs     Order Current Status    Blood Culture - Blood, Arm, Left Preliminary result    Blood Culture - Blood, Arm, Left Preliminary result              This document has been electronically signed by NICHOLAS Levine on January 14, 2022 13:17 CST        Time: 30 minutes spent on assessment, discussion, management, and discharge planning for this patient.

## 2022-01-17 LAB
BACTERIA SPEC AEROBE CULT: NORMAL
BACTERIA SPEC AEROBE CULT: NORMAL

## 2022-01-18 ENCOUNTER — LAB REQUISITION (OUTPATIENT)
Dept: LAB | Facility: HOSPITAL | Age: 87
End: 2022-01-18

## 2022-01-18 DIAGNOSIS — R50.9 FEVER, UNSPECIFIED: ICD-10-CM

## 2022-01-18 LAB
BACTERIA UR QL AUTO: ABNORMAL /HPF
BILIRUB UR QL STRIP: NEGATIVE
CLARITY UR: CLEAR
COLOR UR: YELLOW
GLUCOSE UR STRIP-MCNC: NEGATIVE MG/DL
HGB UR QL STRIP.AUTO: ABNORMAL
HYALINE CASTS UR QL AUTO: ABNORMAL /LPF
KETONES UR QL STRIP: NEGATIVE
LEUKOCYTE ESTERASE UR QL STRIP.AUTO: NEGATIVE
NITRITE UR QL STRIP: NEGATIVE
PH UR STRIP.AUTO: 5.5 [PH] (ref 5–9)
PROT UR QL STRIP: ABNORMAL
RBC # UR STRIP: ABNORMAL /HPF
REF LAB TEST METHOD: ABNORMAL
SP GR UR STRIP: 1.01 (ref 1–1.03)
SQUAMOUS #/AREA URNS HPF: ABNORMAL /HPF
UROBILINOGEN UR QL STRIP: ABNORMAL
WBC # UR STRIP: ABNORMAL /HPF

## 2022-01-18 PROCEDURE — 81001 URINALYSIS AUTO W/SCOPE: CPT | Performed by: NURSE PRACTITIONER

## 2022-01-18 PROCEDURE — 87086 URINE CULTURE/COLONY COUNT: CPT | Performed by: NURSE PRACTITIONER

## 2022-01-19 LAB
QT INTERVAL: 422 MS
QTC INTERVAL: 452 MS

## 2022-01-20 LAB — BACTERIA SPEC AEROBE CULT: NO GROWTH

## 2022-04-13 ENCOUNTER — LAB REQUISITION (OUTPATIENT)
Dept: LAB | Facility: HOSPITAL | Age: 87
End: 2022-04-13

## 2022-04-13 DIAGNOSIS — E03.9 HYPOTHYROIDISM, UNSPECIFIED: ICD-10-CM

## 2022-04-13 DIAGNOSIS — I10 ESSENTIAL (PRIMARY) HYPERTENSION: ICD-10-CM

## 2022-04-13 DIAGNOSIS — E78.5 HYPERLIPIDEMIA, UNSPECIFIED: ICD-10-CM

## 2022-04-13 DIAGNOSIS — G30.9 ALZHEIMER'S DISEASE, UNSPECIFIED (CODE): ICD-10-CM

## 2022-04-13 DIAGNOSIS — E53.8 DEFICIENCY OF OTHER SPECIFIED B GROUP VITAMINS: ICD-10-CM

## 2022-04-13 LAB
25(OH)D3 SERPL-MCNC: 66.1 NG/ML (ref 30–100)
ALBUMIN SERPL-MCNC: 3.5 G/DL (ref 3.5–5.2)
ALBUMIN/GLOB SERPL: 0.8 G/DL
ALP SERPL-CCNC: 78 U/L (ref 39–117)
ALT SERPL W P-5'-P-CCNC: 17 U/L (ref 1–41)
ANION GAP SERPL CALCULATED.3IONS-SCNC: 13 MMOL/L (ref 5–15)
AST SERPL-CCNC: 34 U/L (ref 1–40)
BASOPHILS # BLD AUTO: 0.04 10*3/MM3 (ref 0–0.2)
BASOPHILS NFR BLD AUTO: 0.5 % (ref 0–1.5)
BILIRUB SERPL-MCNC: 0.2 MG/DL (ref 0–1.2)
BUN SERPL-MCNC: 58 MG/DL (ref 8–23)
BUN/CREAT SERPL: 28.4 (ref 7–25)
CALCIUM SPEC-SCNC: 8.7 MG/DL (ref 8.6–10.5)
CHLORIDE SERPL-SCNC: 105 MMOL/L (ref 98–107)
CHOLEST SERPL-MCNC: 139 MG/DL (ref 0–200)
CO2 SERPL-SCNC: 24 MMOL/L (ref 22–29)
CREAT SERPL-MCNC: 2.04 MG/DL (ref 0.76–1.27)
DEPRECATED RDW RBC AUTO: 54.2 FL (ref 37–54)
EGFRCR SERPLBLD CKD-EPI 2021: 31 ML/MIN/1.73
EOSINOPHIL # BLD AUTO: 0.3 10*3/MM3 (ref 0–0.4)
EOSINOPHIL NFR BLD AUTO: 4 % (ref 0.3–6.2)
ERYTHROCYTE [DISTWIDTH] IN BLOOD BY AUTOMATED COUNT: 16.6 % (ref 12.3–15.4)
GLOBULIN UR ELPH-MCNC: 4.2 GM/DL
GLUCOSE SERPL-MCNC: 92 MG/DL (ref 65–99)
HCT VFR BLD AUTO: 28.5 % (ref 37.5–51)
HDLC SERPL-MCNC: 44 MG/DL (ref 40–60)
HGB BLD-MCNC: 9.1 G/DL (ref 13–17.7)
IMM GRANULOCYTES # BLD AUTO: 0.05 10*3/MM3 (ref 0–0.05)
IMM GRANULOCYTES NFR BLD AUTO: 0.7 % (ref 0–0.5)
LDLC SERPL CALC-MCNC: 79 MG/DL (ref 0–100)
LDLC/HDLC SERPL: 1.79 {RATIO}
LYMPHOCYTES # BLD AUTO: 1.16 10*3/MM3 (ref 0.7–3.1)
LYMPHOCYTES NFR BLD AUTO: 15.5 % (ref 19.6–45.3)
MCH RBC QN AUTO: 28.5 PG (ref 26.6–33)
MCHC RBC AUTO-ENTMCNC: 31.9 G/DL (ref 31.5–35.7)
MCV RBC AUTO: 89.3 FL (ref 79–97)
MONOCYTES # BLD AUTO: 0.5 10*3/MM3 (ref 0.1–0.9)
MONOCYTES NFR BLD AUTO: 6.7 % (ref 5–12)
NEUTROPHILS NFR BLD AUTO: 5.42 10*3/MM3 (ref 1.7–7)
NEUTROPHILS NFR BLD AUTO: 72.6 % (ref 42.7–76)
NRBC BLD AUTO-RTO: 0 /100 WBC (ref 0–0.2)
PLATELET # BLD AUTO: 266 10*3/MM3 (ref 140–450)
PMV BLD AUTO: 11 FL (ref 6–12)
POTASSIUM SERPL-SCNC: 4.5 MMOL/L (ref 3.5–5.2)
PROT SERPL-MCNC: 7.7 G/DL (ref 6–8.5)
RBC # BLD AUTO: 3.19 10*6/MM3 (ref 4.14–5.8)
SODIUM SERPL-SCNC: 142 MMOL/L (ref 136–145)
TRIGL SERPL-MCNC: 81 MG/DL (ref 0–150)
TSH SERPL DL<=0.05 MIU/L-ACNC: 27.54 UIU/ML (ref 0.27–4.2)
VIT B12 BLD-MCNC: 966 PG/ML (ref 211–946)
VLDLC SERPL-MCNC: 16 MG/DL (ref 5–40)
WBC NRBC COR # BLD: 7.47 10*3/MM3 (ref 3.4–10.8)

## 2022-04-13 PROCEDURE — 80061 LIPID PANEL: CPT | Performed by: NURSE PRACTITIONER

## 2022-04-13 PROCEDURE — 85025 COMPLETE CBC W/AUTO DIFF WBC: CPT | Performed by: NURSE PRACTITIONER

## 2022-04-13 PROCEDURE — 82306 VITAMIN D 25 HYDROXY: CPT | Performed by: NURSE PRACTITIONER

## 2022-04-13 PROCEDURE — 82607 VITAMIN B-12: CPT | Performed by: NURSE PRACTITIONER

## 2022-04-13 PROCEDURE — 84443 ASSAY THYROID STIM HORMONE: CPT | Performed by: NURSE PRACTITIONER

## 2022-04-13 PROCEDURE — 80053 COMPREHEN METABOLIC PANEL: CPT | Performed by: NURSE PRACTITIONER

## 2022-04-13 PROCEDURE — 36415 COLL VENOUS BLD VENIPUNCTURE: CPT | Performed by: NURSE PRACTITIONER

## 2022-05-02 ENCOUNTER — LAB REQUISITION (OUTPATIENT)
Dept: LAB | Facility: HOSPITAL | Age: 87
End: 2022-05-02

## 2022-05-02 DIAGNOSIS — E78.5 HYPERLIPIDEMIA, UNSPECIFIED: ICD-10-CM

## 2022-05-02 LAB — TSH SERPL DL<=0.05 MIU/L-ACNC: 0.66 UIU/ML (ref 0.27–4.2)

## 2022-05-02 PROCEDURE — 36415 COLL VENOUS BLD VENIPUNCTURE: CPT | Performed by: NURSE PRACTITIONER

## 2022-05-02 PROCEDURE — 84443 ASSAY THYROID STIM HORMONE: CPT | Performed by: NURSE PRACTITIONER
